# Patient Record
Sex: FEMALE | Race: WHITE | NOT HISPANIC OR LATINO | Employment: OTHER | URBAN - METROPOLITAN AREA
[De-identification: names, ages, dates, MRNs, and addresses within clinical notes are randomized per-mention and may not be internally consistent; named-entity substitution may affect disease eponyms.]

---

## 2021-01-14 DIAGNOSIS — Z23 NEED FOR VACCINATION: ICD-10-CM

## 2021-12-10 ENCOUNTER — TELEPHONE (OUTPATIENT)
Dept: SCHEDULING | Facility: IMAGING CENTER | Age: 81
End: 2021-12-10

## 2022-01-11 ENCOUNTER — OFFICE VISIT (OUTPATIENT)
Dept: MEDICAL GROUP | Facility: PHYSICIAN GROUP | Age: 82
End: 2022-01-11
Payer: MEDICARE

## 2022-01-11 VITALS
SYSTOLIC BLOOD PRESSURE: 160 MMHG | HEIGHT: 63 IN | OXYGEN SATURATION: 96 % | TEMPERATURE: 98.1 F | DIASTOLIC BLOOD PRESSURE: 78 MMHG | BODY MASS INDEX: 23.21 KG/M2 | RESPIRATION RATE: 14 BRPM | WEIGHT: 131 LBS | HEART RATE: 90 BPM

## 2022-01-11 DIAGNOSIS — Z13.29 THYROID DISORDER SCREEN: ICD-10-CM

## 2022-01-11 DIAGNOSIS — N63.0 BREAST MASS: ICD-10-CM

## 2022-01-11 DIAGNOSIS — Z13.0 SCREENING FOR DEFICIENCY ANEMIA: ICD-10-CM

## 2022-01-11 DIAGNOSIS — Z13.1 ENCOUNTER FOR SCREENING FOR DIABETES MELLITUS: ICD-10-CM

## 2022-01-11 DIAGNOSIS — Z13.6 SCREENING FOR CARDIOVASCULAR CONDITION: ICD-10-CM

## 2022-01-11 PROCEDURE — 99204 OFFICE O/P NEW MOD 45 MIN: CPT | Performed by: INTERNAL MEDICINE

## 2022-01-11 RX ORDER — SULFAMETHOXAZOLE AND TRIMETHOPRIM 800; 160 MG/1; MG/1
1 TABLET ORAL 2 TIMES DAILY
Qty: 20 TABLET | Refills: 0 | Status: SHIPPED | OUTPATIENT
Start: 2022-01-11

## 2022-01-11 ASSESSMENT — PATIENT HEALTH QUESTIONNAIRE - PHQ9: CLINICAL INTERPRETATION OF PHQ2 SCORE: 0

## 2022-01-11 NOTE — PROGRESS NOTES
"Subjective:     CC: Establish care    HISTORY OF THE PRESENT ILLNESS: Patient is a 81 y.o. female. This pleasant patient is here today to establish care and discuss the following issues:    The patient has not had medical care in many years.  We do not have records available for review.  She reports a prior diagnosis of colon cancer status post partial colectomy in 2008.  She reports a palpable right breast mass for over 1 year.  She states that it is nontender.  It has been cracked and bleeding for about 1 year.  She does not recall when she last had a mammogram.  She also states that she tripped and fell 9/2021 and broke her wrist and proximal humerus.  There was no head trauma or LOC.  The patient declined surgical intervention.    Allergies: Patient has no allergy information on record.    Current Outpatient Medications Ordered in Epic   Medication Sig Dispense Refill   • sulfamethoxazole-trimethoprim (BACTRIM DS) 800-160 MG tablet Take 1 Tablet by mouth 2 times a day. 20 Tablet 0     No current Epic-ordered facility-administered medications on file.       History reviewed. No pertinent past medical history.    History reviewed. No pertinent surgical history.    Social History     Tobacco Use   • Smoking status: Never Smoker   • Smokeless tobacco: Never Used   Substance Use Topics   • Alcohol use: Never   • Drug use: Never       Social History     Social History Narrative   • Not on file       History reviewed. No pertinent family history.    Health Maintenance: Completed    ROS:   Gen: no fevers/chills  Pulm: no sob, no cough  CV: no chest pain, no palpitations  GI: no nausea/vomiting, no diarrhea  Neuro: no headaches, no numbness/tingling      Objective:     Exam: /78   Pulse 90   Temp 36.7 °C (98.1 °F)   Resp 14   Ht 1.6 m (5' 3\")   Wt 59.4 kg (131 lb)   SpO2 96%  Body mass index is 23.21 kg/m².    General: Normal appearing. No distress.  Breast: 4 cm breast mass at 7 to 10 o' clock position with " overlying skin changes and bleeding of the right breast breast, examined seated and supine. No axillary or supraclavicular adenopathy.     Assessment & Plan:   81 y.o. female with the following -    Breast mass  This is an acute condition.  Progressive.  Exam findings concerning for breast cancer.  Have placed orders for an urgent mammogram and diagnostic ultrasound.  Will cover with Bactrim for possible overlying cellulitis.  - MA-DIAGNOSTIC MAMMO BILAT W/TOMOSYNTHESIS W/CAD; Future  - US-BREAST BILAT-COMPLETE; Future  - sulfamethoxazole-trimethoprim (BACTRIM DS) 800-160 MG tablet; Take 1 Tablet by mouth 2 times a day.  Dispense: 20 Tablet; Refill: 0    Screening for deficiency anemia  - CBC WITH DIFFERENTIAL; Future    Encounter for screening for diabetes mellitus  - Comp Metabolic Panel; Future    Thyroid disorder screen  - TSH WITH REFLEX TO FT4; Future    Screening for cardiovascular condition  - Lipid Profile; Future      Return in about 1 week (around 1/18/2022) for f/u labs.    Please note that this dictation was created using voice recognition software. I have made every reasonable attempt to correct obvious errors, but I expect that there are errors of grammar and possibly content that I did not discover before finalizing the note.

## 2022-01-12 ENCOUNTER — HOSPITAL ENCOUNTER (OUTPATIENT)
Dept: RADIOLOGY | Facility: MEDICAL CENTER | Age: 82
End: 2022-01-12
Attending: INTERNAL MEDICINE
Payer: MEDICARE

## 2022-01-12 ENCOUNTER — HOSPITAL ENCOUNTER (OUTPATIENT)
Dept: LAB | Facility: MEDICAL CENTER | Age: 82
End: 2022-01-12
Attending: INTERNAL MEDICINE
Payer: MEDICARE

## 2022-01-12 DIAGNOSIS — N63.0 BREAST MASS IN FEMALE: ICD-10-CM

## 2022-01-12 DIAGNOSIS — N64.52 DISCHARGE FROM BREAST: ICD-10-CM

## 2022-01-12 DIAGNOSIS — N64.4 BREAST PAIN: ICD-10-CM

## 2022-01-12 DIAGNOSIS — Z13.6 SCREENING FOR CARDIOVASCULAR CONDITION: ICD-10-CM

## 2022-01-12 DIAGNOSIS — Z13.0 SCREENING FOR DEFICIENCY ANEMIA: ICD-10-CM

## 2022-01-12 DIAGNOSIS — Z13.1 ENCOUNTER FOR SCREENING FOR DIABETES MELLITUS: ICD-10-CM

## 2022-01-12 DIAGNOSIS — Z13.29 THYROID DISORDER SCREEN: ICD-10-CM

## 2022-01-12 LAB
ALBUMIN SERPL BCP-MCNC: 4 G/DL (ref 3.2–4.9)
ALBUMIN/GLOB SERPL: 1.3 G/DL
ALP SERPL-CCNC: 51 U/L (ref 30–99)
ALT SERPL-CCNC: 32 U/L (ref 2–50)
ANION GAP SERPL CALC-SCNC: 15 MMOL/L (ref 7–16)
AST SERPL-CCNC: 22 U/L (ref 12–45)
BASOPHILS # BLD AUTO: 1.2 % (ref 0–1.8)
BASOPHILS # BLD: 0.08 K/UL (ref 0–0.12)
BILIRUB SERPL-MCNC: 0.4 MG/DL (ref 0.1–1.5)
BUN SERPL-MCNC: 18 MG/DL (ref 8–22)
CALCIUM SERPL-MCNC: 9.7 MG/DL (ref 8.5–10.5)
CHLORIDE SERPL-SCNC: 97 MMOL/L (ref 96–112)
CHOLEST SERPL-MCNC: 250 MG/DL (ref 100–199)
CO2 SERPL-SCNC: 22 MMOL/L (ref 20–33)
CREAT SERPL-MCNC: 0.74 MG/DL (ref 0.5–1.4)
EOSINOPHIL # BLD AUTO: 0.11 K/UL (ref 0–0.51)
EOSINOPHIL NFR BLD: 1.6 % (ref 0–6.9)
ERYTHROCYTE [DISTWIDTH] IN BLOOD BY AUTOMATED COUNT: 39.8 FL (ref 35.9–50)
GLOBULIN SER CALC-MCNC: 3.1 G/DL (ref 1.9–3.5)
GLUCOSE SERPL-MCNC: 385 MG/DL (ref 65–99)
HCT VFR BLD AUTO: 45 % (ref 37–47)
HDLC SERPL-MCNC: 68 MG/DL
HGB BLD-MCNC: 15.2 G/DL (ref 12–16)
IMM GRANULOCYTES # BLD AUTO: 0.01 K/UL (ref 0–0.11)
IMM GRANULOCYTES NFR BLD AUTO: 0.1 % (ref 0–0.9)
LDLC SERPL CALC-MCNC: 123 MG/DL
LYMPHOCYTES # BLD AUTO: 2.03 K/UL (ref 1–4.8)
LYMPHOCYTES NFR BLD: 29.7 % (ref 22–41)
MCH RBC QN AUTO: 28.1 PG (ref 27–33)
MCHC RBC AUTO-ENTMCNC: 33.8 G/DL (ref 33.6–35)
MCV RBC AUTO: 83.2 FL (ref 81.4–97.8)
MONOCYTES # BLD AUTO: 0.76 K/UL (ref 0–0.85)
MONOCYTES NFR BLD AUTO: 11.1 % (ref 0–13.4)
NEUTROPHILS # BLD AUTO: 3.85 K/UL (ref 2–7.15)
NEUTROPHILS NFR BLD: 56.3 % (ref 44–72)
NRBC # BLD AUTO: 0 K/UL
NRBC BLD-RTO: 0 /100 WBC
PLATELET # BLD AUTO: 261 K/UL (ref 164–446)
PMV BLD AUTO: 9.9 FL (ref 9–12.9)
POTASSIUM SERPL-SCNC: 3.8 MMOL/L (ref 3.6–5.5)
PROT SERPL-MCNC: 7.1 G/DL (ref 6–8.2)
RBC # BLD AUTO: 5.41 M/UL (ref 4.2–5.4)
SODIUM SERPL-SCNC: 134 MMOL/L (ref 135–145)
TRIGL SERPL-MCNC: 296 MG/DL (ref 0–149)
TSH SERPL DL<=0.005 MIU/L-ACNC: 3.78 UIU/ML (ref 0.38–5.33)
WBC # BLD AUTO: 6.8 K/UL (ref 4.8–10.8)

## 2022-01-12 PROCEDURE — 85025 COMPLETE CBC W/AUTO DIFF WBC: CPT | Mod: GA

## 2022-01-12 PROCEDURE — G0279 TOMOSYNTHESIS, MAMMO: HCPCS

## 2022-01-12 PROCEDURE — 80053 COMPREHEN METABOLIC PANEL: CPT

## 2022-01-12 PROCEDURE — 80061 LIPID PANEL: CPT

## 2022-01-12 PROCEDURE — 36415 COLL VENOUS BLD VENIPUNCTURE: CPT

## 2022-01-12 PROCEDURE — 76642 ULTRASOUND BREAST LIMITED: CPT | Mod: RT

## 2022-01-12 PROCEDURE — 84443 ASSAY THYROID STIM HORMONE: CPT | Mod: GA

## 2022-01-13 DIAGNOSIS — R73.01 ELEVATED FASTING GLUCOSE: ICD-10-CM

## 2022-01-21 ENCOUNTER — HOSPITAL ENCOUNTER (OUTPATIENT)
Dept: RADIOLOGY | Facility: MEDICAL CENTER | Age: 82
End: 2022-01-21
Attending: INTERNAL MEDICINE
Payer: MEDICARE

## 2022-01-21 DIAGNOSIS — R92.8 ABNORMAL FINDINGS ON DIAGNOSTIC IMAGING OF BREAST: ICD-10-CM

## 2022-01-21 LAB — PATHOLOGY CONSULT NOTE: NORMAL

## 2022-01-21 PROCEDURE — 10035 PLMT SFT TISS LOCLZJ DEV 1ST: CPT | Mod: XU

## 2022-01-21 PROCEDURE — 19083 BX BREAST 1ST LESION US IMAG: CPT | Mod: RT

## 2022-01-21 PROCEDURE — 19285 PERQ DEV BREAST 1ST US IMAG: CPT | Mod: RT

## 2022-01-21 PROCEDURE — 88342 IMHCHEM/IMCYTCHM 1ST ANTB: CPT

## 2022-01-21 PROCEDURE — 88305 TISSUE EXAM BY PATHOLOGIST: CPT

## 2022-01-21 PROCEDURE — 88341 IMHCHEM/IMCYTCHM EA ADD ANTB: CPT

## 2022-01-21 PROCEDURE — 76942 ECHO GUIDE FOR BIOPSY: CPT

## 2022-01-24 ENCOUNTER — TELEPHONE (OUTPATIENT)
Dept: RADIOLOGY | Facility: MEDICAL CENTER | Age: 82
End: 2022-01-24

## 2022-01-25 ENCOUNTER — TELEMEDICINE (OUTPATIENT)
Dept: MEDICAL GROUP | Facility: PHYSICIAN GROUP | Age: 82
End: 2022-01-25
Payer: MEDICARE

## 2022-01-25 VITALS — HEIGHT: 64 IN | BODY MASS INDEX: 22.2 KG/M2 | RESPIRATION RATE: 18 BRPM | WEIGHT: 130 LBS

## 2022-01-25 DIAGNOSIS — R73.01 ELEVATED FASTING GLUCOSE: ICD-10-CM

## 2022-01-25 DIAGNOSIS — D24.1 INTRADUCTAL PAPILLOMA OF RIGHT BREAST: ICD-10-CM

## 2022-01-25 DIAGNOSIS — E78.2 MIXED HYPERLIPIDEMIA: ICD-10-CM

## 2022-01-25 PROCEDURE — 99214 OFFICE O/P EST MOD 30 MIN: CPT | Mod: 95 | Performed by: INTERNAL MEDICINE

## 2022-01-25 RX ORDER — METFORMIN HYDROCHLORIDE 500 MG/1
500 TABLET, EXTENDED RELEASE ORAL DAILY
Qty: 30 TABLET | Refills: 2 | Status: SHIPPED | OUTPATIENT
Start: 2022-01-25

## 2022-01-25 ASSESSMENT — FIBROSIS 4 INDEX: FIB4 SCORE: 1.18

## 2022-01-25 NOTE — PROGRESS NOTES
"Virtual Visit: Established Patient   This visit was conducted via Zoom using secure and encrypted videoconferencing technology. The patient was in a private location in the state of [place].    The patient's identity was confirmed and verbal consent was obtained for this virtual visit.    Subjective:   CC:   Chief Complaint   Patient presents with   • Results     labs, imaging       Rohini Salinas is a 81 y.o. female presenting for evaluation and management of:    The patient feels well.  No acute complaints.  She is here to follow-up on breast imaging and biopsy results, as well as lab results.      ROS   Denies any recent fevers or chills. No nausea or vomiting. No chest pains or shortness of breath.     Not on File    Current medicines (including changes today)  Current Outpatient Medications   Medication Sig Dispense Refill   • metFORMIN ER (GLUCOPHAGE XR) 500 MG TABLET SR 24 HR Take 1 Tablet by mouth every day. 30 Tablet 2   • sulfamethoxazole-trimethoprim (BACTRIM DS) 800-160 MG tablet Take 1 Tablet by mouth 2 times a day. 20 Tablet 0     No current facility-administered medications for this visit.       Patient Active Problem List    Diagnosis Date Noted   • Mixed hyperlipidemia 01/25/2022   • Elevated fasting glucose 01/25/2022   • Intraductal papilloma of right breast 01/25/2022       History reviewed. No pertinent family history.    She  has no past medical history on file.  She  has no past surgical history on file.       Objective:   Resp 18   Ht 1.613 m (5' 3.5\") Comment: per pt  Wt 59 kg (130 lb) Comment: per pt  BMI 22.67 kg/m²     Physical Exam:  Constitutional: Alert, no distress, well-groomed.  Skin: No rashes in visible areas.  Eye: Round. Conjunctiva clear, lids normal. No icterus.   ENMT: Lips pink without lesions, good dentition, moist mucous membranes. Phonation normal.  Neck: No masses, no thyromegaly. Moves freely without pain.  Respiratory: Unlabored respiratory effort, no cough or " audible wheeze  Psych: Alert and oriented x3, normal affect and mood.       Assessment and Plan:   The following treatment plan was discussed:     Intraductal papilloma of right breast  This is a chronic medical condition.  Ongoing.  The patient underwent ultrasound-guided biopsy on 1/21/2022 which showed intraductal papilloma, no evidence of atypia or malignancy.  Have placed a referral to general surgery for further evaluation and treatment.  - Referral to General Surgery    Mixed hyperlipidemia  This is a chronic medical condition.  Ongoing.  Lipid panel from 1/12/2022 showed a total cholesterol 250, , HDL 68, triglycerides 296. The 10-year ASCVD risk score ([place] VASU Jr., et al., 2013) is: 35.4%  -We will defer starting medication until the patient is stabilized on her Metformin medication  -Patient advised to follow a low-cholesterol diet which means reduced red meat and pork products, fried or fatty foods, high fat dairy products    Elevated fasting glucose  This is a chronic medical condition.  Ongoing.  Patient had an elevated fasting glucose on 1/12/2022 of 385.  No hemoglobin A1c available.  -We will obtain a baseline hemoglobin A1c  -Start trial of Metformin 500 mg SR daily  - HEMOGLOBIN A1C; Future  - metFORMIN ER (GLUCOPHAGE XR) 500 MG TABLET SR 24 HR; Take 1 Tablet by mouth every day.  Dispense: 30 Tablet; Refill: 2      Follow-up: Return in about 4 weeks (around 2/22/2022) for Biopsy and Metformin.     Please note that this dictation was created using voice recognition software. I have made every reasonable attempt to correct obvious errors, but I expect that there are errors of grammar and possibly content that I did not discover before finalizing the note.

## 2022-01-28 LAB — HBA1C MFR BLD: 11.9 % (ref 4.8–5.6)

## 2022-06-01 ENCOUNTER — TELEPHONE (OUTPATIENT)
Dept: MEDICAL GROUP | Facility: PHYSICIAN GROUP | Age: 82
End: 2022-06-01
Payer: MEDICARE

## 2022-06-01 NOTE — TELEPHONE ENCOUNTER
1. Caller Name: Ashley                        Call Back Number: [phone]      How would the patient prefer to be contacted with a response:     Received call from the breast center stating pt had an abnormal mammo in Jan and was referred to general surgery.  Pt had an appointment that she cancelled.  Tried calling pt x 3 and was not able to leave [facility].

## 2025-02-23 ENCOUNTER — HOSPITAL ENCOUNTER (OUTPATIENT)
Dept: RADIOLOGY | Facility: MEDICAL CENTER | Age: 85
End: 2025-02-23

## 2025-02-23 ENCOUNTER — HOSPITAL ENCOUNTER (INPATIENT)
Facility: MEDICAL CENTER | Age: 85
LOS: 4 days | End: 2025-02-27
Attending: STUDENT IN AN ORGANIZED HEALTH CARE EDUCATION/TRAINING PROGRAM | Admitting: STUDENT IN AN ORGANIZED HEALTH CARE EDUCATION/TRAINING PROGRAM
Payer: MEDICARE

## 2025-02-23 DIAGNOSIS — K21.9 GASTROESOPHAGEAL REFLUX DISEASE, UNSPECIFIED WHETHER ESOPHAGITIS PRESENT: ICD-10-CM

## 2025-02-23 DIAGNOSIS — N63.10 MASS OF RIGHT BREAST, UNSPECIFIED QUADRANT: ICD-10-CM

## 2025-02-23 DIAGNOSIS — E11.65 TYPE 2 DIABETES MELLITUS WITH HYPERGLYCEMIA, WITHOUT LONG-TERM CURRENT USE OF INSULIN (HCC): ICD-10-CM

## 2025-02-23 DIAGNOSIS — N30.01 ACUTE CYSTITIS WITH HEMATURIA: ICD-10-CM

## 2025-02-23 PROBLEM — D72.829 LEUKOCYTOSIS: Status: ACTIVE | Noted: 2025-02-23

## 2025-02-23 PROBLEM — R79.89 ELEVATED BLOOD KETONE BODY LEVEL: Status: ACTIVE | Noted: 2025-02-23

## 2025-02-23 PROBLEM — N17.9 AKI (ACUTE KIDNEY INJURY) (HCC): Status: ACTIVE | Noted: 2025-02-23

## 2025-02-23 PROBLEM — E87.20 LACTIC ACID ACIDOSIS: Status: ACTIVE | Noted: 2025-02-23

## 2025-02-23 PROBLEM — N17.9 SEPSIS WITH ACUTE RENAL FAILURE (HCC): Status: ACTIVE | Noted: 2025-02-23

## 2025-02-23 PROBLEM — E87.6 HYPOKALEMIA: Status: ACTIVE | Noted: 2025-02-23

## 2025-02-23 PROBLEM — S42.331K: Status: ACTIVE | Noted: 2025-02-23

## 2025-02-23 PROBLEM — A41.9 SEPSIS WITH ACUTE RENAL FAILURE (HCC): Status: ACTIVE | Noted: 2025-02-23

## 2025-02-23 PROBLEM — E11.10 TYPE 2 DIABETES MELLITUS WITH KETOACIDOSIS WITHOUT COMA, WITHOUT LONG-TERM CURRENT USE OF INSULIN (HCC): Status: ACTIVE | Noted: 2025-02-23

## 2025-02-23 PROBLEM — I48.91 ATRIAL FIBRILLATION WITH RAPID VENTRICULAR RESPONSE (HCC): Status: ACTIVE | Noted: 2025-02-23

## 2025-02-23 PROBLEM — R65.20 SEPSIS WITH ACUTE RENAL FAILURE (HCC): Status: ACTIVE | Noted: 2025-02-23

## 2025-02-23 LAB
ALBUMIN SERPL BCP-MCNC: 2.6 G/DL (ref 3.2–4.9)
ALBUMIN/GLOB SERPL: 0.8 G/DL
ALP SERPL-CCNC: 59 U/L (ref 30–99)
ALT SERPL-CCNC: 23 U/L (ref 2–50)
ANION GAP SERPL CALC-SCNC: 19 MMOL/L (ref 7–16)
APPEARANCE UR: ABNORMAL
APTT PPP: 24.2 SEC (ref 24.7–36)
AST SERPL-CCNC: 26 U/L (ref 12–45)
B-OH-BUTYR SERPL-MCNC: 3.07 MMOL/L (ref 0.02–0.27)
BACTERIA #/AREA URNS HPF: ABNORMAL /HPF
BILIRUB SERPL-MCNC: 0.2 MG/DL (ref 0.1–1.5)
BILIRUB UR QL STRIP.AUTO: NEGATIVE
BUN SERPL-MCNC: 39 MG/DL (ref 8–22)
CALCIUM ALBUM COR SERPL-MCNC: 9.8 MG/DL (ref 8.5–10.5)
CALCIUM SERPL-MCNC: 8.7 MG/DL (ref 8.5–10.5)
CASTS URNS QL MICRO: ABNORMAL /LPF (ref 0–2)
CHLORIDE SERPL-SCNC: 97 MMOL/L (ref 96–112)
CO2 SERPL-SCNC: 21 MMOL/L (ref 20–33)
COLOR UR: YELLOW
CORTIS SERPL-MCNC: 108 UG/DL (ref 0–23)
CREAT SERPL-MCNC: 1.9 MG/DL (ref 0.5–1.4)
EPITHELIAL CELLS 1715: ABNORMAL /HPF (ref 0–5)
ERYTHROCYTE [DISTWIDTH] IN BLOOD BY AUTOMATED COUNT: 39.7 FL (ref 35.9–50)
EST. AVERAGE GLUCOSE BLD GHB EST-MCNC: 384 MG/DL
GFR SERPLBLD CREATININE-BSD FMLA CKD-EPI: 26 ML/MIN/1.73 M 2
GLOBULIN SER CALC-MCNC: 3.4 G/DL (ref 1.9–3.5)
GLUCOSE BLD STRIP.AUTO-MCNC: 391 MG/DL (ref 65–99)
GLUCOSE SERPL-MCNC: 411 MG/DL (ref 65–99)
GLUCOSE UR STRIP.AUTO-MCNC: 250 MG/DL
HBA1C MFR BLD: 15 % (ref 4–5.6)
HCT VFR BLD AUTO: 36.2 % (ref 37–47)
HGB BLD-MCNC: 11.3 G/DL (ref 12–16)
HOLDING TUBE BB 8507: NORMAL
INR PPP: 1.26 (ref 0.87–1.13)
INR PPP: 1.28 (ref 0.87–1.13)
KETONES UR STRIP.AUTO-MCNC: 15 MG/DL
LACTATE SERPL-SCNC: 3.8 MMOL/L (ref 0.5–2)
LEUKOCYTE ESTERASE UR QL STRIP.AUTO: ABNORMAL
MAGNESIUM SERPL-MCNC: 2 MG/DL (ref 1.5–2.5)
MCH RBC QN AUTO: 24.9 PG (ref 27–33)
MCHC RBC AUTO-ENTMCNC: 31.2 G/DL (ref 32.2–35.5)
MCV RBC AUTO: 79.7 FL (ref 81.4–97.8)
MICRO URNS: ABNORMAL
NITRITE UR QL STRIP.AUTO: NEGATIVE
PH UR STRIP.AUTO: 5 [PH] (ref 5–8)
PHOSPHATE SERPL-MCNC: 2.9 MG/DL (ref 2.5–4.5)
PLATELET # BLD AUTO: 416 K/UL (ref 164–446)
PMV BLD AUTO: 9.5 FL (ref 9–12.9)
POTASSIUM SERPL-SCNC: 3.5 MMOL/L (ref 3.6–5.5)
PROT SERPL-MCNC: 6 G/DL (ref 6–8.2)
PROT UR QL STRIP: 100 MG/DL
PROTHROMBIN TIME: 15.8 SEC (ref 12–14.6)
PROTHROMBIN TIME: 16 SEC (ref 12–14.6)
RBC # BLD AUTO: 4.54 M/UL (ref 4.2–5.4)
RBC # URNS HPF: ABNORMAL /HPF (ref 0–2)
RBC UR QL AUTO: ABNORMAL
SODIUM SERPL-SCNC: 137 MMOL/L (ref 135–145)
SP GR UR STRIP.AUTO: 1.01
TSH SERPL DL<=0.005 MIU/L-ACNC: 0.58 UIU/ML (ref 0.38–5.33)
UFH PPP CHRO-ACNC: <0.1 IU/ML
UROBILINOGEN UR STRIP.AUTO-MCNC: 0.2 EU/DL
WBC # BLD AUTO: 22.8 K/UL (ref 4.8–10.8)
WBC #/AREA URNS HPF: ABNORMAL /HPF
YEAST BUDDING URNS QL: PRESENT /HPF
YEAST HYPHAE #/AREA URNS HPF: PRESENT /HPF

## 2025-02-23 PROCEDURE — 85027 COMPLETE CBC AUTOMATED: CPT

## 2025-02-23 PROCEDURE — 80053 COMPREHEN METABOLIC PANEL: CPT

## 2025-02-23 PROCEDURE — 87086 URINE CULTURE/COLONY COUNT: CPT

## 2025-02-23 PROCEDURE — A9270 NON-COVERED ITEM OR SERVICE: HCPCS | Performed by: STUDENT IN AN ORGANIZED HEALTH CARE EDUCATION/TRAINING PROGRAM

## 2025-02-23 PROCEDURE — 83735 ASSAY OF MAGNESIUM: CPT

## 2025-02-23 PROCEDURE — 82533 TOTAL CORTISOL: CPT

## 2025-02-23 PROCEDURE — 85520 HEPARIN ASSAY: CPT

## 2025-02-23 PROCEDURE — 85610 PROTHROMBIN TIME: CPT | Mod: 91

## 2025-02-23 PROCEDURE — 84443 ASSAY THYROID STIM HORMONE: CPT

## 2025-02-23 PROCEDURE — 82010 KETONE BODYS QUAN: CPT

## 2025-02-23 PROCEDURE — 83036 HEMOGLOBIN GLYCOSYLATED A1C: CPT

## 2025-02-23 PROCEDURE — 93005 ELECTROCARDIOGRAM TRACING: CPT | Mod: TC | Performed by: STUDENT IN AN ORGANIZED HEALTH CARE EDUCATION/TRAINING PROGRAM

## 2025-02-23 PROCEDURE — 87040 BLOOD CULTURE FOR BACTERIA: CPT

## 2025-02-23 PROCEDURE — 84100 ASSAY OF PHOSPHORUS: CPT

## 2025-02-23 PROCEDURE — 700102 HCHG RX REV CODE 250 W/ 637 OVERRIDE(OP): Performed by: STUDENT IN AN ORGANIZED HEALTH CARE EDUCATION/TRAINING PROGRAM

## 2025-02-23 PROCEDURE — 81001 URINALYSIS AUTO W/SCOPE: CPT

## 2025-02-23 PROCEDURE — 700111 HCHG RX REV CODE 636 W/ 250 OVERRIDE (IP): Performed by: STUDENT IN AN ORGANIZED HEALTH CARE EDUCATION/TRAINING PROGRAM

## 2025-02-23 PROCEDURE — 770022 HCHG ROOM/CARE - ICU (200)

## 2025-02-23 PROCEDURE — 700105 HCHG RX REV CODE 258: Performed by: STUDENT IN AN ORGANIZED HEALTH CARE EDUCATION/TRAINING PROGRAM

## 2025-02-23 PROCEDURE — 83605 ASSAY OF LACTIC ACID: CPT

## 2025-02-23 PROCEDURE — 82962 GLUCOSE BLOOD TEST: CPT | Mod: 91

## 2025-02-23 PROCEDURE — 85730 THROMBOPLASTIN TIME PARTIAL: CPT

## 2025-02-23 PROCEDURE — 99291 CRITICAL CARE FIRST HOUR: CPT | Performed by: STUDENT IN AN ORGANIZED HEALTH CARE EDUCATION/TRAINING PROGRAM

## 2025-02-23 RX ORDER — AZITHROMYCIN 500 MG/5ML
500 INJECTION, POWDER, LYOPHILIZED, FOR SOLUTION INTRAVENOUS DAILY
Status: DISCONTINUED | OUTPATIENT
Start: 2025-02-24 | End: 2025-02-24

## 2025-02-23 RX ORDER — HEPARIN SODIUM 1000 [USP'U]/ML
40 INJECTION, SOLUTION INTRAVENOUS; SUBCUTANEOUS PRN
Status: DISCONTINUED | OUTPATIENT
Start: 2025-02-23 | End: 2025-02-25 | Stop reason: ALTCHOICE

## 2025-02-23 RX ORDER — THIAMINE HYDROCHLORIDE 100 MG/ML
100 INJECTION, SOLUTION INTRAMUSCULAR; INTRAVENOUS DAILY
Status: DISCONTINUED | OUTPATIENT
Start: 2025-02-25 | End: 2025-02-26

## 2025-02-23 RX ORDER — INSULIN LISPRO 100 [IU]/ML
3-14 INJECTION, SOLUTION INTRAVENOUS; SUBCUTANEOUS
Status: DISCONTINUED | OUTPATIENT
Start: 2025-02-23 | End: 2025-02-27 | Stop reason: HOSPADM

## 2025-02-23 RX ORDER — HEPARIN SODIUM 1000 [USP'U]/ML
80 INJECTION, SOLUTION INTRAVENOUS; SUBCUTANEOUS ONCE
Status: COMPLETED | OUTPATIENT
Start: 2025-02-23 | End: 2025-02-23

## 2025-02-23 RX ORDER — ACETAMINOPHEN 325 MG/1
650 TABLET ORAL EVERY 6 HOURS PRN
Status: DISCONTINUED | OUTPATIENT
Start: 2025-02-23 | End: 2025-02-27 | Stop reason: HOSPADM

## 2025-02-23 RX ORDER — POLYETHYLENE GLYCOL 3350 17 G/17G
1 POWDER, FOR SOLUTION ORAL
Status: DISCONTINUED | OUTPATIENT
Start: 2025-02-23 | End: 2025-02-25

## 2025-02-23 RX ORDER — MIDODRINE HYDROCHLORIDE 5 MG/1
15 TABLET ORAL
Status: DISCONTINUED | OUTPATIENT
Start: 2025-02-23 | End: 2025-02-27 | Stop reason: HOSPADM

## 2025-02-23 RX ORDER — THIAMINE HYDROCHLORIDE 100 MG/ML
200 INJECTION, SOLUTION INTRAMUSCULAR; INTRAVENOUS 2 TIMES DAILY
Status: COMPLETED | OUTPATIENT
Start: 2025-02-23 | End: 2025-02-24

## 2025-02-23 RX ORDER — INSULIN LISPRO 100 [IU]/ML
0.2 INJECTION, SOLUTION INTRAVENOUS; SUBCUTANEOUS
Status: DISCONTINUED | OUTPATIENT
Start: 2025-02-24 | End: 2025-02-27 | Stop reason: HOSPADM

## 2025-02-23 RX ORDER — ENOXAPARIN SODIUM 100 MG/ML
40 INJECTION SUBCUTANEOUS DAILY
Status: DISCONTINUED | OUTPATIENT
Start: 2025-02-23 | End: 2025-02-23

## 2025-02-23 RX ORDER — HEPARIN SODIUM 5000 [USP'U]/100ML
0-30 INJECTION, SOLUTION INTRAVENOUS CONTINUOUS
Status: DISCONTINUED | OUTPATIENT
Start: 2025-02-23 | End: 2025-02-25 | Stop reason: ALTCHOICE

## 2025-02-23 RX ORDER — SODIUM CHLORIDE, SODIUM LACTATE, POTASSIUM CHLORIDE, AND CALCIUM CHLORIDE .6; .31; .03; .02 G/100ML; G/100ML; G/100ML; G/100ML
1000 INJECTION, SOLUTION INTRAVENOUS ONCE
Status: COMPLETED | OUTPATIENT
Start: 2025-02-23 | End: 2025-02-23

## 2025-02-23 RX ORDER — AMOXICILLIN 250 MG
2 CAPSULE ORAL EVERY EVENING
Status: DISCONTINUED | OUTPATIENT
Start: 2025-02-23 | End: 2025-02-25

## 2025-02-23 RX ORDER — DEXTROSE MONOHYDRATE 25 G/50ML
25 INJECTION, SOLUTION INTRAVENOUS
Status: DISCONTINUED | OUTPATIENT
Start: 2025-02-23 | End: 2025-02-27 | Stop reason: HOSPADM

## 2025-02-23 RX ORDER — DIGOXIN 0.25 MG/ML
250 INJECTION INTRAMUSCULAR; INTRAVENOUS ONCE
Status: COMPLETED | OUTPATIENT
Start: 2025-02-23 | End: 2025-02-23

## 2025-02-23 RX ORDER — DEXTROSE MONOHYDRATE AND SODIUM CHLORIDE 5; .45 G/100ML; G/100ML
INJECTION, SOLUTION INTRAVENOUS CONTINUOUS
Status: DISCONTINUED | OUTPATIENT
Start: 2025-02-23 | End: 2025-02-23

## 2025-02-23 RX ADMIN — INSULIN LISPRO 12 UNITS: 100 INJECTION, SOLUTION INTRAVENOUS; SUBCUTANEOUS at 22:47

## 2025-02-23 RX ADMIN — MIDODRINE HYDROCHLORIDE 15 MG: 5 TABLET ORAL at 23:16

## 2025-02-23 RX ADMIN — HEPARIN SODIUM 3700 UNITS: 1000 INJECTION, SOLUTION INTRAVENOUS; SUBCUTANEOUS at 22:48

## 2025-02-23 RX ADMIN — SODIUM CHLORIDE, POTASSIUM CHLORIDE, SODIUM LACTATE AND CALCIUM CHLORIDE 1000 ML: 600; 310; 30; 20 INJECTION, SOLUTION INTRAVENOUS at 21:06

## 2025-02-23 RX ADMIN — DIGOXIN 250 MCG: 250 INJECTION, SOLUTION INTRAMUSCULAR; INTRAVENOUS; PARENTERAL at 22:56

## 2025-02-23 RX ADMIN — PHENYLEPHRINE HYDROCHLORIDE 0.25 MCG/KG/MIN: 100 INJECTION INTRAVENOUS at 22:46

## 2025-02-23 RX ADMIN — INSULIN GLARGINE-YFGN 10 UNITS: 100 INJECTION, SOLUTION SUBCUTANEOUS at 22:36

## 2025-02-23 RX ADMIN — HEPARIN SODIUM 18 UNITS/KG/HR: 5000 INJECTION, SOLUTION INTRAVENOUS at 22:52

## 2025-02-23 RX ADMIN — THIAMINE HYDROCHLORIDE 200 MG: 100 INJECTION, SOLUTION INTRAMUSCULAR; INTRAVENOUS at 22:41

## 2025-02-23 ASSESSMENT — ENCOUNTER SYMPTOMS
FALLS: 1
FEVER: 0
SORE THROAT: 0
ABDOMINAL PAIN: 0
EYES NEGATIVE: 1
SHORTNESS OF BREATH: 0
HEADACHES: 0
WEAKNESS: 1
NAUSEA: 0
FOCAL WEAKNESS: 0
PALPITATIONS: 0
VOMITING: 0
CHILLS: 0
SPUTUM PRODUCTION: 0

## 2025-02-23 ASSESSMENT — PAIN DESCRIPTION - PAIN TYPE: TYPE: ACUTE PAIN

## 2025-02-23 NOTE — PROGRESS NOTES
[facility] DIRECT ADMIT ACCEPTANCE NOTE    Transferring facility: [facility]  Transferring provider: Tata    Chief complaint: DKA, PNA, Afib, fungating breast mass, R humerus fracture  Pertinent history & patient course:   85 yo F with T2DM who presented to the ED following a fall at home. When she came to the ED she was found to be in Afib with RVR (rates in the 140s). She was found to be in DKA/HHS with elevated blood sugar and HCO3 of 18 with AG of 27.     She was also found to have a R mid humerus fracture and a fungating R breast mass. CT chest with bilateral PNA.     She was given 2L IVF and started on an insuling gtt. Given ceftriaxone and azithromycin.     Last Vital signs:  , RR 18 not on supplemental oxygen, /64    Pertinent imaging & lab results: See above  Consultants called prior to transfer and pertinent input from consultants: N/A  Code Status: Full per transferring provider, I personally verified with the transferring provider patient's code status and the transferring provider has confirmed this with the patient.  Reason for Transfer: DKA, sepsis  Further work up or recommendations requested prior to transfer: None    Patient accepted for transfer: Yes  Accepting [facility] Facility: [facility] Nursing to notify the Triage Coordinator in the RTOC via Voalte or Phone ext. [phone] when patient arrives to the unit. The Triage Coordinator will assign the admitting provider.    Consultants to be called upon arrival: N/A  Admission status: Inpatient.   Floor requested: Any available ICU    The admitting provider is the point of contact for questions or concerns regarding the patient's care.    Ruben Patel DO, MPH  Critical Care Medicine

## 2025-02-24 ENCOUNTER — APPOINTMENT (OUTPATIENT)
Dept: RADIOLOGY | Facility: MEDICAL CENTER | Age: 85
End: 2025-02-24
Attending: EMERGENCY MEDICINE
Payer: MEDICARE

## 2025-02-24 PROBLEM — E87.20 LACTIC ACID ACIDOSIS: Status: RESOLVED | Noted: 2025-02-23 | Resolved: 2025-02-24

## 2025-02-24 PROBLEM — D72.829 LEUKOCYTOSIS: Status: RESOLVED | Noted: 2025-02-23 | Resolved: 2025-02-24

## 2025-02-24 PROBLEM — E87.6 HYPOKALEMIA: Status: RESOLVED | Noted: 2025-02-23 | Resolved: 2025-02-24

## 2025-02-24 PROBLEM — R79.89 ELEVATED BLOOD KETONE BODY LEVEL: Status: RESOLVED | Noted: 2025-02-23 | Resolved: 2025-02-24

## 2025-02-24 LAB
ALBUMIN SERPL BCP-MCNC: 2.5 G/DL (ref 3.2–4.9)
ALBUMIN/GLOB SERPL: 0.7 G/DL
ALP SERPL-CCNC: 71 U/L (ref 30–99)
ALT SERPL-CCNC: 22 U/L (ref 2–50)
ANION GAP SERPL CALC-SCNC: 10 MMOL/L (ref 7–16)
ANION GAP SERPL CALC-SCNC: 12 MMOL/L (ref 7–16)
AST SERPL-CCNC: 23 U/L (ref 12–45)
B-OH-BUTYR SERPL-MCNC: 0.27 MMOL/L (ref 0.02–0.27)
BACTERIA BLD CULT: NORMAL
BACTERIA BLD CULT: NORMAL
BASE EXCESS BLDV CALC-SCNC: 3 MMOL/L (ref -2–3)
BILIRUB SERPL-MCNC: <0.2 MG/DL (ref 0.1–1.5)
BODY TEMPERATURE: ABNORMAL DEGREES
BUN SERPL-MCNC: 39 MG/DL (ref 8–22)
BUN SERPL-MCNC: 39 MG/DL (ref 8–22)
CALCIUM ALBUM COR SERPL-MCNC: 9.9 MG/DL (ref 8.5–10.5)
CALCIUM SERPL-MCNC: 8.1 MG/DL (ref 8.5–10.5)
CALCIUM SERPL-MCNC: 8.7 MG/DL (ref 8.5–10.5)
CHLORIDE SERPL-SCNC: 101 MMOL/L (ref 96–112)
CHLORIDE SERPL-SCNC: 103 MMOL/L (ref 96–112)
CO2 BLDV-SCNC: 29 MMOL/L (ref 20–33)
CO2 SERPL-SCNC: 24 MMOL/L (ref 20–33)
CO2 SERPL-SCNC: 25 MMOL/L (ref 20–33)
CREAT SERPL-MCNC: 1.73 MG/DL (ref 0.5–1.4)
CREAT SERPL-MCNC: 1.78 MG/DL (ref 0.5–1.4)
DELSYS IDSYS: ABNORMAL
EKG IMPRESSION: NORMAL
ERYTHROCYTE [DISTWIDTH] IN BLOOD BY AUTOMATED COUNT: 40.5 FL (ref 35.9–50)
GFR SERPLBLD CREATININE-BSD FMLA CKD-EPI: 28 ML/MIN/1.73 M 2
GFR SERPLBLD CREATININE-BSD FMLA CKD-EPI: 29 ML/MIN/1.73 M 2
GLOBULIN SER CALC-MCNC: 3.7 G/DL (ref 1.9–3.5)
GLUCOSE BLD STRIP.AUTO-MCNC: 150 MG/DL (ref 65–99)
GLUCOSE BLD STRIP.AUTO-MCNC: 176 MG/DL (ref 65–99)
GLUCOSE BLD STRIP.AUTO-MCNC: 177 MG/DL (ref 65–99)
GLUCOSE BLD STRIP.AUTO-MCNC: 237 MG/DL (ref 65–99)
GLUCOSE BLD STRIP.AUTO-MCNC: 244 MG/DL (ref 65–99)
GLUCOSE BLD STRIP.AUTO-MCNC: 359 MG/DL (ref 65–99)
GLUCOSE SERPL-MCNC: 189 MG/DL (ref 65–99)
GLUCOSE SERPL-MCNC: 249 MG/DL (ref 65–99)
HCO3 BLDV-SCNC: 28 MMOL/L (ref 22–29)
HCT VFR BLD AUTO: 36.7 % (ref 37–47)
HGB BLD-MCNC: 11.5 G/DL (ref 12–16)
LACTATE BLD-SCNC: 1.4 MMOL/L (ref 0.5–2)
LACTATE SERPL-SCNC: 2.2 MMOL/L (ref 0.5–2)
LACTATE SERPL-SCNC: 2.6 MMOL/L (ref 0.5–2)
LACTATE SERPL-SCNC: 2.6 MMOL/L (ref 0.5–2)
LPM ILPM: 1 LPM
MAGNESIUM SERPL-MCNC: 2 MG/DL (ref 1.5–2.5)
MCH RBC QN AUTO: 24.8 PG (ref 27–33)
MCHC RBC AUTO-ENTMCNC: 31.3 G/DL (ref 32.2–35.5)
MCV RBC AUTO: 79.3 FL (ref 81.4–97.8)
PCO2 BLDV: 45.5 MMHG (ref 38–54)
PCO2 TEMP ADJ BLDV: 44.9 MMHG (ref 38–54)
PH BLDV: 7.4 [PH] (ref 7.31–7.45)
PH TEMP ADJ BLDV: 7.4 [PH] (ref 7.31–7.45)
PHOSPHATE SERPL-MCNC: 2 MG/DL (ref 2.5–4.5)
PLATELET # BLD AUTO: 515 K/UL (ref 164–446)
PMV BLD AUTO: 9.6 FL (ref 9–12.9)
PO2 BLDV: 31 MMHG (ref 23–48)
PO2 TEMP ADJ BLDV: 31 MMHG (ref 23–48)
POTASSIUM SERPL-SCNC: 3.7 MMOL/L (ref 3.6–5.5)
POTASSIUM SERPL-SCNC: 4 MMOL/L (ref 3.6–5.5)
PROT SERPL-MCNC: 6.2 G/DL (ref 6–8.2)
RBC # BLD AUTO: 4.63 M/UL (ref 4.2–5.4)
SAO2 % BLDV: 59 % (ref 60–85)
SIGNIFICANT IND 70042: NORMAL
SIGNIFICANT IND 70042: NORMAL
SITE SITE: NORMAL
SITE SITE: NORMAL
SODIUM SERPL-SCNC: 137 MMOL/L (ref 135–145)
SODIUM SERPL-SCNC: 138 MMOL/L (ref 135–145)
SOURCE SOURCE: NORMAL
SOURCE SOURCE: NORMAL
SPECIMEN DRAWN FROM PATIENT: ABNORMAL
UFH PPP CHRO-ACNC: 0.27 IU/ML
UFH PPP CHRO-ACNC: 0.3 IU/ML
UFH PPP CHRO-ACNC: 0.37 IU/ML
WBC # BLD AUTO: 29.2 K/UL (ref 4.8–10.8)

## 2025-02-24 PROCEDURE — 80053 COMPREHEN METABOLIC PANEL: CPT

## 2025-02-24 PROCEDURE — 99291 CRITICAL CARE FIRST HOUR: CPT | Performed by: EMERGENCY MEDICINE

## 2025-02-24 PROCEDURE — 82962 GLUCOSE BLOOD TEST: CPT | Mod: 91

## 2025-02-24 PROCEDURE — 700105 HCHG RX REV CODE 258: Performed by: STUDENT IN AN ORGANIZED HEALTH CARE EDUCATION/TRAINING PROGRAM

## 2025-02-24 PROCEDURE — 700102 HCHG RX REV CODE 250 W/ 637 OVERRIDE(OP): Performed by: EMERGENCY MEDICINE

## 2025-02-24 PROCEDURE — 97602 WOUND(S) CARE NON-SELECTIVE: CPT

## 2025-02-24 PROCEDURE — 93010 ELECTROCARDIOGRAM REPORT: CPT | Performed by: INTERNAL MEDICINE

## 2025-02-24 PROCEDURE — 85520 HEPARIN ASSAY: CPT | Mod: 91

## 2025-02-24 PROCEDURE — 36600 WITHDRAWAL OF ARTERIAL BLOOD: CPT

## 2025-02-24 PROCEDURE — 80048 BASIC METABOLIC PNL TOTAL CA: CPT

## 2025-02-24 PROCEDURE — 700111 HCHG RX REV CODE 636 W/ 250 OVERRIDE (IP): Performed by: STUDENT IN AN ORGANIZED HEALTH CARE EDUCATION/TRAINING PROGRAM

## 2025-02-24 PROCEDURE — 84100 ASSAY OF PHOSPHORUS: CPT

## 2025-02-24 PROCEDURE — 83735 ASSAY OF MAGNESIUM: CPT

## 2025-02-24 PROCEDURE — 770022 HCHG ROOM/CARE - ICU (200)

## 2025-02-24 PROCEDURE — 99222 1ST HOSP IP/OBS MODERATE 55: CPT | Mod: 57 | Performed by: ORTHOPAEDIC SURGERY

## 2025-02-24 PROCEDURE — 83605 ASSAY OF LACTIC ACID: CPT | Mod: 91

## 2025-02-24 PROCEDURE — 700101 HCHG RX REV CODE 250: Performed by: INTERNAL MEDICINE

## 2025-02-24 PROCEDURE — A9270 NON-COVERED ITEM OR SERVICE: HCPCS | Performed by: EMERGENCY MEDICINE

## 2025-02-24 PROCEDURE — 700101 HCHG RX REV CODE 250: Performed by: EMERGENCY MEDICINE

## 2025-02-24 PROCEDURE — 82803 BLOOD GASES ANY COMBINATION: CPT

## 2025-02-24 PROCEDURE — 76775 US EXAM ABDO BACK WALL LIM: CPT

## 2025-02-24 PROCEDURE — 82010 KETONE BODYS QUAN: CPT

## 2025-02-24 PROCEDURE — 700105 HCHG RX REV CODE 258: Performed by: INTERNAL MEDICINE

## 2025-02-24 PROCEDURE — A9270 NON-COVERED ITEM OR SERVICE: HCPCS | Performed by: STUDENT IN AN ORGANIZED HEALTH CARE EDUCATION/TRAINING PROGRAM

## 2025-02-24 PROCEDURE — 700111 HCHG RX REV CODE 636 W/ 250 OVERRIDE (IP): Mod: JZ | Performed by: EMERGENCY MEDICINE

## 2025-02-24 PROCEDURE — 700111 HCHG RX REV CODE 636 W/ 250 OVERRIDE (IP): Mod: JZ | Performed by: INTERNAL MEDICINE

## 2025-02-24 PROCEDURE — 700105 HCHG RX REV CODE 258: Performed by: EMERGENCY MEDICINE

## 2025-02-24 PROCEDURE — 85027 COMPLETE CBC AUTOMATED: CPT

## 2025-02-24 PROCEDURE — 700102 HCHG RX REV CODE 250 W/ 637 OVERRIDE(OP): Performed by: STUDENT IN AN ORGANIZED HEALTH CARE EDUCATION/TRAINING PROGRAM

## 2025-02-24 PROCEDURE — 24500 CLTX HUMRL SHFT FX W/O MNPJ: CPT | Mod: RT | Performed by: ORTHOPAEDIC SURGERY

## 2025-02-24 RX ORDER — SODIUM CHLORIDE, SODIUM LACTATE, POTASSIUM CHLORIDE, AND CALCIUM CHLORIDE .6; .31; .03; .02 G/100ML; G/100ML; G/100ML; G/100ML
500 INJECTION, SOLUTION INTRAVENOUS ONCE
Status: COMPLETED | OUTPATIENT
Start: 2025-02-24 | End: 2025-02-24

## 2025-02-24 RX ORDER — AZITHROMYCIN 500 MG/5ML
500 INJECTION, POWDER, LYOPHILIZED, FOR SOLUTION INTRAVENOUS DAILY
Status: DISCONTINUED | OUTPATIENT
Start: 2025-02-24 | End: 2025-02-25

## 2025-02-24 RX ORDER — MICONAZOLE NITRATE 20 MG/G
CREAM TOPICAL 2 TIMES DAILY
Status: DISCONTINUED | OUTPATIENT
Start: 2025-02-24 | End: 2025-02-27 | Stop reason: HOSPADM

## 2025-02-24 RX ORDER — DEXTROSE MONOHYDRATE 50 MG/ML
INJECTION, SOLUTION INTRAVENOUS CONTINUOUS
Status: DISCONTINUED | OUTPATIENT
Start: 2025-02-24 | End: 2025-02-25

## 2025-02-24 RX ORDER — NYSTATIN 100000 [USP'U]/G
POWDER TOPICAL 2 TIMES DAILY
Status: DISCONTINUED | OUTPATIENT
Start: 2025-02-24 | End: 2025-02-24

## 2025-02-24 RX ADMIN — DEXTROSE MONOHYDRATE: 50 INJECTION, SOLUTION INTRAVENOUS at 08:02

## 2025-02-24 RX ADMIN — AMIODARONE HYDROCHLORIDE 1 MG/MIN: 1.8 INJECTION, SOLUTION INTRAVENOUS at 08:26

## 2025-02-24 RX ADMIN — THIAMINE HYDROCHLORIDE 200 MG: 100 INJECTION, SOLUTION INTRAMUSCULAR; INTRAVENOUS at 18:15

## 2025-02-24 RX ADMIN — THIAMINE HYDROCHLORIDE 200 MG: 100 INJECTION, SOLUTION INTRAMUSCULAR; INTRAVENOUS at 06:07

## 2025-02-24 RX ADMIN — DEXTROSE MONOHYDRATE: 50 INJECTION, SOLUTION INTRAVENOUS at 21:03

## 2025-02-24 RX ADMIN — MICONAZOLE NITRATE: 2 CREAM TOPICAL at 18:18

## 2025-02-24 RX ADMIN — INSULIN LISPRO 4 UNITS: 100 INJECTION, SOLUTION INTRAVENOUS; SUBCUTANEOUS at 18:10

## 2025-02-24 RX ADMIN — SODIUM CHLORIDE, POTASSIUM CHLORIDE, SODIUM LACTATE AND CALCIUM CHLORIDE 500 ML: 600; 310; 30; 20 INJECTION, SOLUTION INTRAVENOUS at 14:12

## 2025-02-24 RX ADMIN — CEFTRIAXONE SODIUM 2000 MG: 10 INJECTION, POWDER, FOR SOLUTION INTRAVENOUS at 20:51

## 2025-02-24 RX ADMIN — NYSTATIN: 100000 POWDER TOPICAL at 08:57

## 2025-02-24 RX ADMIN — SODIUM CHLORIDE, POTASSIUM CHLORIDE, SODIUM LACTATE AND CALCIUM CHLORIDE 500 ML: 600; 310; 30; 20 INJECTION, SOLUTION INTRAVENOUS at 18:05

## 2025-02-24 RX ADMIN — INSULIN GLARGINE-YFGN 10 UNITS: 100 INJECTION, SOLUTION SUBCUTANEOUS at 18:12

## 2025-02-24 RX ADMIN — HEPARIN SODIUM 20 UNITS/KG/HR: 5000 INJECTION, SOLUTION INTRAVENOUS at 15:48

## 2025-02-24 RX ADMIN — INSULIN LISPRO 3 UNITS: 100 INJECTION, SOLUTION INTRAVENOUS; SUBCUTANEOUS at 06:16

## 2025-02-24 RX ADMIN — SODIUM CHLORIDE, POTASSIUM CHLORIDE, SODIUM LACTATE AND CALCIUM CHLORIDE 500 ML: 600; 310; 30; 20 INJECTION, SOLUTION INTRAVENOUS at 22:45

## 2025-02-24 RX ADMIN — INSULIN LISPRO 3 UNITS: 100 INJECTION, SOLUTION INTRAVENOUS; SUBCUTANEOUS at 20:45

## 2025-02-24 RX ADMIN — AMIODARONE HYDROCHLORIDE 150 MG: 1.5 INJECTION, SOLUTION INTRAVENOUS at 08:09

## 2025-02-24 RX ADMIN — AZITHROMYCIN DIHYDRATE 500 MG: 500 INJECTION, POWDER, LYOPHILIZED, FOR SOLUTION INTRAVENOUS at 20:58

## 2025-02-24 RX ADMIN — INSULIN LISPRO 4 UNITS: 100 INJECTION, SOLUTION INTRAVENOUS; SUBCUTANEOUS at 11:51

## 2025-02-24 RX ADMIN — SODIUM CHLORIDE, POTASSIUM CHLORIDE, SODIUM LACTATE AND CALCIUM CHLORIDE 500 ML: 600; 310; 30; 20 INJECTION, SOLUTION INTRAVENOUS at 08:45

## 2025-02-24 RX ADMIN — SODIUM PHOSPHATE, MONOBASIC, MONOHYDRATE AND SODIUM PHOSPHATE, DIBASIC, ANHYDROUS 15 MMOL: 276; 142 INJECTION, SOLUTION INTRAVENOUS at 11:00

## 2025-02-24 RX ADMIN — AMIODARONE HYDROCHLORIDE 0.5 MG/MIN: 1.8 INJECTION, SOLUTION INTRAVENOUS at 14:07

## 2025-02-24 RX ADMIN — MIDODRINE HYDROCHLORIDE 15 MG: 5 TABLET ORAL at 18:15

## 2025-02-24 ASSESSMENT — PAIN DESCRIPTION - PAIN TYPE
TYPE: ACUTE PAIN

## 2025-02-24 NOTE — THERAPY
Occupational Therapy Contact Note    OT order received. Spoke with bedside RN who requested to hold evaluation due to medical instability and current mentation. OT will follow up as able.    Darcy Farris OTR/L

## 2025-02-24 NOTE — ASSESSMENT & PLAN NOTE
Sepsis vs malnutrition / thiamine deficiency  Empiric abx   Blood cultures pending  High dose thiamine  trend

## 2025-02-24 NOTE — H&P
"Critical Care History & Physical    Date of consult: 02/23/25    Referring Physician  Iván Merritt M.D.    Reason for Consultation  DKA, hypotension, tachycardia    History of Presenting Illness  84 y.o. female with a history of fungating breast mass (last biopsied in January 2022-biopsy appeared to be negative despite highly suspicious mass at the time, now it is fungating and eroding through her skin.  Given negative biopsy she did not have it evaluated further but has \"been meaning to have it removed\"), previous right humerus fracture that was managed nonoperatively by orthopedics in late 2022 and early 2023.  At that time it appeared to be healing fairly well based on the care everywhere reports.  Also a history of diabetes.    She was transferred from [place] this afternoon where she presented after she fell at home.  She was found to be in A-fib RVR with rates in the 140s, also in mild DKA with a bicarb of 18 and anion gap of 27.  She was given 2 L of crystalloid, ceftriaxone and azithromycin and started on an insulin infusion.  She also fell onto her right side and has now worsening angulation in her right humerus fracture.  She is currently endorsing full code status.    Code Status  Full Code    Review of Systems  Review of Systems   Constitutional:  Negative for chills, fever and malaise/fatigue.   HENT:  Negative for congestion and sore throat.    Eyes: Negative.    Respiratory:  Negative for sputum production and shortness of breath.    Cardiovascular:  Negative for chest pain and palpitations.   Gastrointestinal:  Negative for abdominal pain, nausea and vomiting.   Genitourinary: Negative.    Musculoskeletal:  Positive for falls.   Skin: Negative.    Neurological:  Positive for weakness. Negative for focal weakness and headaches.   All other systems reviewed and are negative.       Past Medical History   has a past medical history of Humerus fracture and Type 2 diabetes mellitus (HCC).    Surgical " History   has a past surgical history that includes no pertinent past surgical history.    Family History  Reviewed and not pertinent    Social History   reports that she has never smoked. She has never used smokeless tobacco. She reports that she does not drink alcohol and does not use drugs.    Medications  Home Medications    **Home medications have not yet been reviewed for this encounter**         Allergies  Not on File      Vital Signs last 24 hours  Pulse:  [148] 148  Resp:  [21] 21  BP: (96)/(66) 96/66  SpO2:  [91 %] 91 %      Physical Exam   Physical Exam  Vitals and nursing note reviewed. Exam conducted with a chaperone present.   Constitutional:       General: She is awake. She is not in acute distress.     Appearance: Normal appearance. She is underweight. She is ill-appearing.   HENT:      Head: Normocephalic.      Mouth/Throat:      Mouth: Mucous membranes are moist.   Eyes:      Extraocular Movements: Extraocular movements intact.      Pupils: Pupils are equal, round, and reactive to light.   Cardiovascular:      Rate and Rhythm: Tachycardia present. Rhythm irregular.      Pulses: Normal pulses.      Comments: Fungating and eroding breast mass  Pulmonary:      Effort: Pulmonary effort is normal. No respiratory distress.   Abdominal:      General: There is no distension.      Palpations: Abdomen is soft.      Tenderness: There is abdominal tenderness (mild, diffuse, non-focal). There is no guarding or rebound.   Genitourinary:     Comments: Perineum is excoriated (see picture)  Musculoskeletal:         General: Deformity present. Normal range of motion.      Cervical back: Normal range of motion and neck supple.      Comments: She has sensation, pulses and movement in her distal RUE   Skin:     General: Skin is warm and dry.      Capillary Refill: Capillary refill takes less than 2 seconds.   Neurological:      General: No focal deficit present.      Mental Status: She is alert.       Breast  Mass        Perineal excoriations        Fluids  No intake or output data in the 24 hours ending 25 [id number]      Laboratory  Recent Results (from the past 48 hours)   POCT glucose device results    Collection Time: 25  8:03 PM   Result Value Ref Range    POC Glucose, Blood 391 (H) 65 - 99 mg/dL   EKG    Collection Time: 25  8:28 PM   Result Value Ref Range    Report       [facility]    Test Date:  2025  Pt Name:    GERMAIN STROUD               Department: [facility]  MRN:        [id number]                      Room:       [facility]  Gender:     Female                       Technician: KELLY  :        [id number]                   Requested By:ZACHARIAH WILLARD IV  Order #:    [id number]                    Reading MD:    Measurements  Intervals                                Axis  Rate:       165                          P:          0  ND:         0                            QRS:        -61  QRSD:       98                           T:          114  QT:         257  QTc:        426    Interpretive Statements  Atrial fibrillation with rapid V-rate  Left anterior fascicular block  Repolarization abnormality, prob rate related  No previous ECG available for comparison     CBC WITHOUT DIFFERENTIAL    Collection Time: 25  8:40 PM   Result Value Ref Range    WBC 22.8 (H) 4.8 - 10.8 K/uL    RBC 4.54 4.20 - 5.40 M/uL    Hemoglobin 11.3 (L) 12.0 - 16.0 g/dL    Hematocrit 36.2 (L) 37.0 - 47.0 %    MCV 79.7 (L) 81.4 - 97.8 fL    MCH 24.9 (L) 27.0 - 33.0 pg    MCHC 31.2 (L) 32.2 - 35.5 g/dL    RDW 39.7 35.9 - 50.0 fL    Platelet Count 416 164 - 446 K/uL    MPV 9.5 9.0 - 12.9 fL   Comp Metabolic Panel    Collection Time: 25  8:40 PM   Result Value Ref Range    Sodium 137 135 - 145 mmol/L    Potassium 3.5 (L) 3.6 - 5.5 mmol/L    Chloride 97 96 - 112 mmol/L    Co2 21 20 - 33 mmol/L    Anion Gap 19.0 (H) 7.0 - 16.0    Glucose 411 (H) 65 - 99 mg/dL    Bun 39 (H) 8 - 22 mg/dL    Creatinine 1.90 (H) 0.50 - 1.40  mg/dL    Calcium 8.7 8.5 - 10.5 mg/dL    Correct Calcium 9.8 8.5 - 10.5 mg/dL    AST(SGOT) 26 12 - 45 U/L    ALT(SGPT) 23 2 - 50 U/L    Alkaline Phosphatase 59 30 - 99 U/L    Total Bilirubin 0.2 0.1 - 1.5 mg/dL    Albumin 2.6 (L) 3.2 - 4.9 g/dL    Total Protein 6.0 6.0 - 8.2 g/dL    Globulin 3.4 1.9 - 3.5 g/dL    A-G Ratio 0.8 g/dL   MAGNESIUM    Collection Time: 02/23/25  8:40 PM   Result Value Ref Range    Magnesium 2.0 1.5 - 2.5 mg/dL   PHOSPHORUS    Collection Time: 02/23/25  8:40 PM   Result Value Ref Range    Phosphorus 2.9 2.5 - 4.5 mg/dL   LACTIC ACID    Collection Time: 02/23/25  8:40 PM   Result Value Ref Range    Lactic Acid 3.8 (H) 0.5 - 2.0 mmol/L   Prothrombin Time    Collection Time: 02/23/25  8:40 PM   Result Value Ref Range    PT 16.0 (H) 12.0 - 14.6 sec    INR 1.28 (H) 0.87 - 1.13   HEMOGLOBIN A1C    Collection Time: 02/23/25  8:40 PM   Result Value Ref Range    Glycohemoglobin 15.0 (H) 4.0 - 5.6 %    Est Avg Glucose 384 mg/dL   BETA-HYDROXYBUTYRIC ACID    Collection Time: 02/23/25  8:40 PM   Result Value Ref Range    beta-Hydroxybutyric Acid 3.07 (H) 0.02 - 0.27 mmol/L   CORTISOL    Collection Time: 02/23/25  8:40 PM   Result Value Ref Range    Cortisol 108.0 (H) 0.0 - 23.0 ug/dL   TSH WITH REFLEX TO FT4    Collection Time: 02/23/25  8:40 PM   Result Value Ref Range    TSH 0.582 0.380 - 5.330 uIU/mL   ESTIMATED GFR    Collection Time: 02/23/25  8:40 PM   Result Value Ref Range    GFR (CKD-EPI) 26 (A) >60 mL/min/1.73 m 2   HOLD BLOOD BANK SPECIMEN (NOT TESTED)    Collection Time: 02/23/25  8:40 PM   Result Value Ref Range    Holding Tube - Bb DONE    Urinalysis    Collection Time: 02/23/25 10:08 PM    Specimen: Urine, Ibarra Cath   Result Value Ref Range    Micro Urine Req Microscopic          Imaging  EC-ECHOCARDIOGRAM COMPLETE W/O CONT    (Results Pending)         Assessment/Plan  * Atrial fibrillation with rapid ventricular response (HCC)  Assessment & Plan  Unclear chronicity  Therapeutic AC  with heparin infusion    TSH normal  ECHO pending    Given uncertainty of onset will avoid cardioversion if possible  BP too low for metoprolol / diltiazem    Renal function is moderately dysfunctional  - will give 1x dose of digoxin 250mcg and re-evaluate    Can consider cardiology eval for LEONARDO / cardioversion if there is no clinical improvement    Elevated blood ketone body level  Assessment & Plan  BOHB is 3  CO2 21  AG 19    Given her presentation which is concerning for inability to care for herself, I think there is probably a component of starvation ketosis.  For starters I am going to put her on D5 +1/2 NS for some fluids overnight and see if BOHB is better in the morning  - if BOHB is not improved by 6am she'll likely need insulin infusion  - Also, if blood sugar is not controlled with the heparin + mealtime + SSI I ordered she'll need insulin infusion    Will reassess as night goes on but for now will use dextrose fluids    Sepsis with acute renal failure (HCC)  Assessment & Plan  This is Sepsis Present on admission  SIRS criteria identified on my evaluation include: Tachycardia, with heart rate greater than 90 BPM, Tachypnea, with respirations greater than 20 per minute, and Leukocytosis, with WBC greater than 12,000  Clinical indicators of end organ dysfunction include Lactic Acid greater than 2 and Toxic Metabolic Encephalopathy  Source is Unclear, check urine, wound care  Sepsis protocol initiated  Crystalloid Fluid Administration: Fluid resuscitation ordered per standard protocol - 30 mL/kg per current or ideal body weight (plus an additional 1000mL)  IV antibiotics as appropriate for source of sepsis  Reassessment: I have reassessed the patient's hemodynamic status    Blood cultures  Empiric Abx  Follow lactate  Pressors if needed to keep MAP >65    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  Assessment & Plan  Old fracture, now re-broken  Neuro intact  Ortho consult  (ok for them to see tomorrow but we will engage them tonight)  Will make non-weight bearing RUE    Lactic acid acidosis  Assessment & Plan  Sepsis vs malnutrition / thiamine deficiency  Empiric abx   Blood cultures pending  High dose thiamine  trend    Type 2 diabetes mellitus with ketoacidosis without coma, without long-term current use of insulin ([facility])  Assessment & Plan  Not in DKA  A1C 15    Glargine + SSI + mealtime  May need 180 protocol overnight    Check BMP q6 for now (CMP at 0600 will suffice, can have day team re-order if needed)  Replace K, Mg, PO4 per protocol    Renal diet    Mass of right breast  Assessment & Plan  Not acute finding, she has known about this for several years  Biopsy was non-diagnostic    Reasonable to follow up as outpatient as she lives in [place]    Surgical consult would be warranted if she would prefer to seek treatment here, but her acute metabolic issues will take precedence    HERACLIO (acute kidney injury) ([facility])  Assessment & Plan  Strict I/Os  Renally dosed medications  Avoid nephrotoxic agents as able  Trend     Hypokalemia  Assessment & Plan  Check BMP daily  Goal is 4  Replete as necessary     Leukocytosis  Assessment & Plan  Reactive vs infectious  Empiric abx  Blood cultures pending  trend        DVT prophylaxis: Heparin infusion (afib)  PUD prophylaxis: NA  Glycemic control: Glargine + Mealtime + SSI  Nutrition: Renal Diet  Lines: NA  Ibarra: Will place     Discussed patient condition and risk of morbidity and/or mortality with RN, RT, Pharmacy, , Code status disscussed, Charge nurse / hot rounds, and Patient.      The patient remains critically ill.  I am titrating a heparin infusion based on Xa levels.  Critical care time = 64 minutes in directly providing and coordinating critical care and extensive data review.  No time overlap and excludes procedures.

## 2025-02-24 NOTE — THERAPY
Physical Therapy Contact Note    Patient Name: Rohini Salinas  Age:  84 y.o., Sex:  female  Medical Record #: [id number]  Today's Date: 2/24/2025 02/24/25 0849   Interdisciplinary Plan of Care Collaboration   IDT Collaboration with  Nursing   Collaboration Comments PT order received. Spoke with bedside RN who requested to hold evaluation due to medical instability and current mentation. PT will follow up as able.

## 2025-02-24 NOTE — ASSESSMENT & PLAN NOTE
Not acute finding, she has known about this for several years  Biopsy was non-diagnostic  Reasonable to follow up as outpatient as she lives in [place]

## 2025-02-24 NOTE — TREATMENT PLAN
Chart and imaging reviewed, R diaphyseal humerus fx.  acute refracture vs persistent nonunion, no intervening images to determine and current xrays are unrevealing.  Patient has numerous pressing medical issues.  Recommend sling for now, will get patient fitted for a hernandez brace tomorrow.  Plan for preliminary nonoperative treatment.

## 2025-02-24 NOTE — PROGRESS NOTES
Well I just spoke with pharmacy, unfortunately all dextrose fluids are on critical shortage.  Given that her CMP is not severely deranged from acid/base standpoint, I think holding off Dextrose fluids is fine and we will just encourage PO.    She'll need a social work consult as well tomorrow as I have concerns about her ability to care for herself.  Her perineal wounds are suggestive of prolonged periods sitting in her own feces/urine, and her fungating breast mass is advanced.      Plan:  - ok to hold dextrose fluids tonight  - encourage PO  - check BOHB in the morning  - SW consult tomorrow    Iván Merritt M.D.

## 2025-02-24 NOTE — ASSESSMENT & PLAN NOTE
Baseline creatinine 0.7.  Up to 1.8.  Likely related to sepsis.  No indications for renal replacement therapy at present.  Renal US with morphologically normal kidneys.  monitor UOP and electrolytes

## 2025-02-24 NOTE — CARE PLAN
The patient is Watcher - Medium risk of patient condition declining or worsening    Shift Goals  Clinical Goals: stable hemodymics  Patient Goals: comfort  Family Goals: no family present    Progress made toward(s) clinical / shift goals:    Problem: Knowledge Deficit - Standard  Goal: Patient and family/care givers will demonstrate understanding of plan of care, disease process/condition, diagnostic tests and medications  Description: Target End Date:  1-3 days or as soon as patient condition allows    Document in Patient Education    1.  Patient and family/caregiver oriented to unit, equipment, visitation policy and means for communicating concern  2.  Complete/review Learning Assessment  3.  Assess knowledge level of disease process/condition, treatment plan, diagnostic tests and medications  4.  Explain disease process/condition, treatment plan, diagnostic tests and medications  Outcome: Progressing     Problem: Hemodynamics  Goal: Patient's hemodynamics, fluid balance and neurologic status will be stable or improve  Description: Target End Date:  Prior to discharge or change in level of care    Document on Assessment and I/O flowsheet templates    1.  Monitor vital signs, pulse oximetry and cardiac monitor per provider order and/or policy  2.  Maintain blood pressure per provider order  3.  Hemodynamic monitoring per provider order  4.  Manage IV fluids and IV infusions  5.  Monitor intake and output  6.  Daily weights per unit policy or provider order  7.  Assess peripheral pulses and capillary refill  8.  Assess color and body temperature  9.  Position patient for maximum circulation/cardiac output  10. Monitor for signs/symptoms of excessive bleeding  11. Assess mental status, restlessness and changes in level of consciousness  12. Monitor temperature and report fever or hypothermia to provider immediately. Consideration of targeted temperature management.  Reactivated     Problem: Fluid Volume  Goal: Fluid  volume balance will be maintained  Description: Target End Date:  Prior to discharge or change in level of care    Document on I/O flowsheet    1.  Monitor intake and output as ordered  2.  Promote oral intake as appropriate  3.  Report inadequate intake or output to physician  4.  Administer IV therapy as ordered  5.  Weights per provider order  6.  Assess for signs and symptoms of bleeding  7.  Monitor for signs of fluid overload (respiratory changes, edema, weight gain, increased abdominal girth)  8.  Monitor of signs for inadequate fluid volume (poor skin turgor, dry mucous membranes)  9.  Instruct patient on adherence to fluid restrictions  Reactivated     Problem: Urinary - Renal Perfusion  Goal: Ability to achieve and maintain adequate renal perfusion and functioning will improve  Description: Target End Date:  Prior to discharge or change in level of care    Document on I/O and Assessment flowsheet    1.  Urine output will remain greater than 0.5ml/Kg/HR  2.  Monitor amount and/or characteristics of urine per order/policy. Specific gravity per order/policy  3.  Assess signs and symptoms of renal dysfunction  Reactivated     Problem: Physical Regulation  Goal: Diagnostic test results will improve  Description: Target End Date:  Prior to discharge or change in level of care    1.  Monitor lactic acid levels  2.  Monitor ABG's  3.  Monitor diagnostic test results  Reactivated  Goal: Signs and symptoms of infection will decrease  Description: Target End Date:  Prior to discharge or change in level of care    1.  Remove potential routes of infection, such as central lines and urinary catheter  2.  Follow facility protocol for changing IV tubing and sites  3.  Collaborate with Infectious Disease  4.  Antibiotic therapy per provider order  5.  Note drug effects and monitor for antibiotic toxicity  Reactivated     Problem: Skin Integrity  Goal: Skin integrity is maintained or improved  Description: Target End Date:   Prior to discharge or change in level of care    Document interventions on Skin Risk/Andrew flowsheet groups and corresponding LDA    1.  Assess and monitor skin integrity, appearance and/or temperature  2.  Assess risk factors for impaired skin integrity and/or pressures ulcers  3.  Implement precautions to protect skin integrity in collaboration with interdisciplinary team  4.  Implement pressure ulcer prevention protocol if at risk for skin breakdown  5.  Confirm wound care consult if at risk for skin breakdown  6.  Ensure patient use of pressure relieving devices  (Low air loss bed, waffle overlay, heel protectors, ROHO cushion, etc)  Reactivated     Problem: Fall Risk  Goal: Patient will remain free from falls  Description: Target End Date:  Prior to discharge or change in level of care    Document interventions on the [place] Fall Risk Assessment    1.  Assess for fall risk factors  2.  Implement fall precautions  Reactivated     Problem: Pain - Standard  Goal: Alleviation of pain or a reduction in pain to the patient’s comfort goal  Description: Target End Date:  Prior to discharge or change in level of care    Document on Vitals flowsheet    1.  Document pain using the appropriate pain scale per order or unit policy  2.  Educate and implement non-pharmacologic comfort measures (i.e. relaxation, distraction, massage, cold/heat therapy, etc.)  3.  Pain management medications as ordered  4.  Reassess pain after pain med administration per policy  5.  If opiods administered assess patient's response to pain medication is appropriate per POSS sedation scale  6.  Follow pain management plan developed in collaboration with patient and interdisciplinary team (including palliative care or pain specialists if applicable)  Reactivated     Problem: Diabetes Management - DKA  Goal: Patient will achieve and maintain glucose in satisfactory range  Description: 1.  Assess precipitating factors such as infection,  "new-onset diabetes, or poor compliance with treatment regimen  2.  Monitor glucose levels per provider order  3.  Assess for signs and symptoms of hyperglycemia (abdominal pain, bloating, nausea or vomitting)  4.  Assess for signs and symptoms of hypoglycemia (anxiety, tremors, slurred speech, change in LOC, tachycardia, fatigue)  5.  Monitor for early signs and symptoms of infection  6.  Monitor daily weights  7.  Consult to diabetes educator  Reactivated     Problem: Fluid Balance or Risk for Fluid Volume Deficit  Goal: Patient will demonstrate adequate hydration and vital signs  Description: Target End Date:  Prior to discharge or change in level of care    1.  Monitor vital signs - monitor for hypotension and tachycardia, shallow, rapid respirations  2.  Monitor intake and output and report inadequate volumes to provider  3.  Daily weights per provider order  4.  Assess for signs of dehydration - peripheral pulses, capillary refill, color, body temperature, skin turgor, dry skin/mucous membranes  5.  Monitor electrolytes and acid/base balance  6.  Administer IV therapy as ordered  Reactivated     Problem: Nutrition Deficit - Diabetes  Goal: Patient will demonstrate adequate hydration and vital signs  Description: Target End Date:  Prior to discharge or change in level of care    1.  Monitor dietary intake  2.  Albumin and prealbumin per provider order  3.  Enteral and parenteral nutrition per provider order  4.  Monitor body weight  5.  Calorie count  6.  Provide oral care  7.  Collaborate with Clinical Dietician and Diabetes Educator  8.  Provide education on diabetic diet  Reactivated     Problem: Ineffective Renal Perfusion  Goal: Patient will display appropriate urinary output with specific gravity/laboratory studies near normal, stable weight, vital signs with patient's normal range and absence of edema.  Description: 1.  Accurately record intake and output noting to include \"hidden\" fluids such as IV " antibiotic additives, liquid medications, frozen treats, ice chips. Religiously measure gastrointestinal losses and estimate insensible losses (sweating), including  wound drainage, nasogastric outputs, and diarrhea.  2.  Monitor urine specific gravity.  3.  Weigh daily at same time of day, on same scale, with same equipment and clothing.  4.  Assess skin, face, dependent areas for edema.  5.  Monitor heart rate (HR), BP, and JVD/CVP.  6.  Auscultate lung and heart sounds.  7.  Assess level of consciousness. Investigate changes in mentation, presence of restlessness.  8.  Scatter desired beverages throughout the 24-hour period and give various offering (hot, cold, frozen).  Reactivated     Problem: Neuro Status  Goal: Neuro status will remain stable or improve  Description: Target End Date:  Prior to discharge or change in level of care    Document on Neuro assessment in the Assessment flowsheet    1.  Assess and monitor neurologic status per provider order/protocol/unit policy  2.  Assess level of consciousness and orientation  3.  Assess for speech, dysarthria, dysphagia, facial symmetry  4.  Assess visual field, eye movements, gaze preference, pupil reaction and size  5.  Assess muscle strength and motor response in all four extremities  6.  Assess for sensation (numbness and tingling)  7.  Assess basic neuro reflexes (cough, gag, corneal)  8.  Identify changes in neuro status and report to provider for testing/treatment orders  Reactivated     Problem: Knowledge Deficit - Diabetes  Goal: Patient will demonstrate knowledge of insulin injection, symptoms, and treatment of hypoglycemia and diet prior to discharge  Description: Target End Date:  1-3 days or as soon as patient condition allows    1.  Explain disease process, medications and methods used in treating diabetes  2.  Explain signs and symptoms of hyper/hypoglycemia  3.  Educate about lifestyle changes including exercise, diet (carb counting) and sick day  management  4.  Educate importance of regular vision appointments, regular foot care and monitoring of skin lesions  5.  Educate patient and family/caregiver on diabetes management and proper use of equipment for testing (syringe, pens and site rotation, glucometer, test strips)  6.  Collaborate with Diabetes Educator  7.  Assess patient and family/caregiver skills with insulin pens and finger sticks. Document level of understanding in patient education  Reactivated

## 2025-02-24 NOTE — ASSESSMENT & PLAN NOTE
Unclear chronicity. TSH normal. Unknown onset - likely associated with sepsis.  Converted to sinus with amiodarone.  Discussed risk/benefit with long term anticoagulation - she wishes to think about it more.  She last fell several months ago, and lives home alone, gets around with a walker  Therapeutic AC with heparin infusion  Replete K and Mg  DC amiodarone infusion  Echo ordered

## 2025-02-24 NOTE — PROGRESS NOTES
Critical Care Progress Note    Date of admission  2/23/2025    Chief Complaint  84 y.o. female admitted 2/23/2025 with DKA, AF with RVR and sepsis.    Hospital Course  84 y.o. female with history of diabetes, breast mass, right humerus fracture in 2022 nonoperatively who presented from [facility] on 2/23 after she fell at home.  She is found to have atrial fibrillation with RVR as well as DKA.  UA consistent with UTI.    Interval Problem Update  Events in last 24 hours: transferred from [facility]  N: A&O x3 but drowsy, slow to respond  CV: Atrial fibrillation with RVR, rate 110-170 on 0.75 phenylephrine. Lactate 2.6 and downtrending  R: room air  FEN/GI: NPO due to mentation.  BMx2  /Renal: Cr 1.8, baseline 0.7  I/O net +1L over last 24 hours. Acidosis rsolved.  Urine with sediment.  Ibarra in place.  Heme: hemoglobin 11.5, stable. Platelets 500K.  Heparin infusion.  ID: WBC 29K, uptrending.  Empiric CTX + azithro, blood cultures 2/23 pending.  UA with pyuria.  Tm 37.7C  Skin/MSK: R humerus fracture - prelim plan for non-operative management. Good perfusion  Endo: SSI + glargine    Family: daughter Stacy unable to be reached by phone.    Home medications reconciled       Review of Systems  ROS     Vital Signs for last 24 hours   Pulse:  [] 88  Resp:  [15-46] 17  BP: ()/(34-72) 106/59  SpO2:  [90 %-99 %] 98 %    Hemodynamic parameters for last 24 hours       Respiratory Information for the last 24 hours       Physical Exam   Physical Exam  Vitals and nursing note reviewed.   Constitutional:       General: She is not in acute distress.     Appearance: She is not toxic-appearing.   HENT:      Head: Normocephalic and atraumatic.   Cardiovascular:      Rate and Rhythm: Normal rate and regular rhythm.   Pulmonary:      Effort: No respiratory distress.   Abdominal:      General: There is no distension.      Palpations: Abdomen is soft.   Musculoskeletal:         General: No swelling.      Comments: ANTOINE  deformity    Palpable radial pulse   Skin:     General: Skin is warm.   Neurological:      Mental Status: She is alert.      Comments: Awake, conversing, fluent speech  Moves all four extremities including RUE         Medications  Current Facility-Administered Medications   Medication Dose Route Frequency Provider Last Rate Last Admin    amiodarone (Nexterone) 360 mg/200 mL infusion  1 mg/min Intravenous Once Flavio Russ M.D. 33.3 mL/hr at 02/24/25 0826 1 mg/min at 02/24/25 0826    Followed by    amiodarone (Nexterone) 360 mg/200 mL infusion  0.5 mg/min Intravenous Continuous Flavio Russ M.D.        dextrose 5% infusion   Intravenous Continuous Flavio Russ M.D. 30 mL/hr at 02/24/25 0802 New Bag at 02/24/25 0802    nystatin (Mycostatin) powder   Topical BID Flavio Russ M.D.   Given at 02/24/25 0857    sodium phosphate 15 mmol in dextrose 5% 250 mL ivpb  15 mmol Intravenous Once Flavio Russ M.D. 83.3 mL/hr at 02/24/25 1100 15 mmol at 02/24/25 1100    phenylephrine 40 mg/250 mL NS premix  0-5 mcg/kg/min Intravenous Continuous Iván Merritt M.D. 13.1 mL/hr at 02/24/25 0647 0.75 mcg/kg/min at 02/24/25 0647    MD Alert...ICU Electrolyte Replacement per Pharmacy   Other PHARMACY TO DOSE Iván Merritt M.D.        acetaminophen (Tylenol) tablet 650 mg  650 mg Oral Q6HRS PRN Iván Merritt M.D.        senna-docusate (Pericolace Or Senokot S) 8.6-50 MG per tablet 2 Tablet  2 Tablet Oral Q EVENING Iván Merritt M.D.        And    polyethylene glycol/lytes (Miralax) Packet 1 Packet  1 Packet Oral QDAY PRN Iván Merritt M.D.        cefTRIAXone (Rocephin) syringe 2,000 mg  2,000 mg Intravenous DAILY AT 1800 Iván Merritt M.D.        azithromycin (ZITHROMAX) 500 mg in D5W 250 mL IVPB premix  500 mg Intravenous DAILY AT 1800 Iván Merritt M.D.        thiamine (B-1) injection 200 mg  200 mg Intravenous BID Iván Merritt M.D.   200 mg at 02/24/25 0607    Followed by    [START ON 2/25/2025] thiamine (B-1)  injection 100 mg  100 mg Intravenous DAILY Iván Merritt M.D.        insulin GLARGINE (Lantus,Semglee) injection  10 Units Subcutaneous Q EVENING Iván Merritt M.D.   10 Units at 02/23/25 2236    And    insulin lispro (HumaLOG,AdmeLOG) subcutaneous injection  0.2 Units/kg/day Subcutaneous TID AC Iván Merritt M.D.   3 Units at 02/24/25 0616    And    insulin lispro (HumaLOG,AdmeLOG) subcutaneous injection  3-14 Units Subcutaneous 4X/DAY ACHS Iván Merritt M.D.   12 Units at 02/23/25 2247    And    dextrose 50% (D50W) injection 25 g  25 g Intravenous Q15 MIN PRN Iván Merritt M.D.        heparin infusion 25,000 units in 500 mL 0.45% NACL  0-30 Units/kg/hr Intravenous Continuous Iván Merritt M.D. 16.8 mL/hr at 02/24/25 0832 18 Units/kg/hr at 02/24/25 0832    heparin injection 1,900 Units  40 Units/kg Intravenous PRN Iván Merritt M.D.        midodrine (Proamatine) tablet 15 mg  15 mg Oral TID WITH MEALS Iván Merritt M.D.   15 mg at 02/23/25 2316       Fluids    Intake/Output Summary (Last 24 hours) at 2/24/2025 1149  Last data filed at 2/24/2025 0800  Gross per 24 hour   Intake 1185.53 ml   Output 345 ml   Net 840.53 ml       Laboratory          Recent Labs     02/23/25 2040 02/24/25  0613   SODIUM 137 138   POTASSIUM 3.5* 4.0   CHLORIDE 97 103   CO2 21 25   BUN 39* 39*   CREATININE 1.90* 1.78*   MAGNESIUM 2.0 2.0   PHOSPHORUS 2.9 2.0*   CALCIUM 8.7 8.7     Recent Labs     02/23/25 2040 02/24/25  0613   ALTSGPT 23 22   ASTSGOT 26 23   ALKPHOSPHAT 59 71   TBILIRUBIN 0.2 <0.2   GLUCOSE 411* 189*     Recent Labs     02/23/25 2040 02/24/25  0613   WBC 22.8* 29.2*   ASTSGOT 26 23   ALTSGPT 23 22   ALKPHOSPHAT 59 71   TBILIRUBIN 0.2 <0.2     Recent Labs     02/23/25  2040 02/23/25  2233 02/24/25  0613   RBC 4.54  --  4.63   HEMOGLOBIN 11.3*  --  11.5*   HEMATOCRIT 36.2*  --  36.7*   PLATELETCT 416  --  515*   PROTHROMBTM 16.0* 15.8*  --    APTT  --  24.2*  --    INR 1.28* 1.26*  --        Assessment/Plan  *  Atrial fibrillation with rapid ventricular response (HCC)  Assessment & Plan  Unclear chronicity. TSH normal. Unknown onset - likely associated with sepsis.  No improvement with 250mcg digoxin  Therapeutic AC with heparin infusion  Replete K and Mg  Amiodarone infusion  Echo pending  Consider LEONARDO / cardioversion if there is no clinical improvement    HERACLIO (acute kidney injury) (HCC)  Assessment & Plan  Baseline creatinine 0.7.  Up to 1.8.  Likely related to sepsis.  No indications for renal replacement therapy at present.  monitor UOP and electrolytes  Renal US      Sepsis with acute renal failure (HCC)  Assessment & Plan  This is Sepsis Present on admission  SIRS criteria identified on my evaluation include: Tachycardia, with heart rate greater than 90 BPM, Tachypnea, with respirations greater than 20 per minute, and Leukocytosis, with WBC greater than 12,000  Clinical indicators of end organ dysfunction include Lactic Acid greater than 2 and Toxic Metabolic Encephalopathy  Source is likely urinary  Sepsis protocol initiated  Crystalloid Fluid Administration: Fluid resuscitation ordered per standard protocol - 30 mL/kg per current or ideal body weight (plus an additional 1000mL)  IV antibiotics as appropriate for source of sepsis  Reassessment: I have reassessed the patient's hemodynamic status    Blood cultures  Urine culture  Empiric ceftriaxone/azithromycin  Phenylephrine for MAP >65  Trend lactate/end organ perfusion    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  Neurovascularly intact  Ortho consult - prelim sling and non-operative    Type 2 diabetes mellitus with ketoacidosis without coma, without long-term current use of insulin ([facility])  Assessment & Plan  A1C 15  Glargine + SSI + mealtime  Replace K, Mg, PO4 per protocol    Renal diet    Mass of right breast  Assessment & Plan  Not acute finding, she has known about this for several  years  Biopsy was non-diagnostic  Reasonable to follow up as outpatient as she lives in [place]         VTE:  Heparin  Ulcer: Not Indicated  Lines: Ibarra Catheter  Ongoing indication addressed    Critical care time: 70 minutes, excluding procedure.    Flavio Russ MD, [facility]  Critical Care Medicine  7:22 AM

## 2025-02-24 NOTE — ASSESSMENT & PLAN NOTE
This is Sepsis Present on admission  SIRS criteria identified on my evaluation include: Tachycardia, with heart rate greater than 90 BPM, Tachypnea, with respirations greater than 20 per minute, and Leukocytosis, with WBC greater than 12,000  Clinical indicators of end organ dysfunction include Lactic Acid greater than 2 and Toxic Metabolic Encephalopathy  Source is likely urinary  Sepsis protocol initiated  Crystalloid Fluid Administration: Fluid resuscitation ordered per standard protocol - 30 mL/kg per current or ideal body weight (plus an additional 1000mL)  IV antibiotics as appropriate for source of sepsis  Reassessment: I have reassessed the patient's hemodynamic status    Blood cultures  Urine culture  Empiric ceftriaxone   Phenylephrine for MAP >65  Trend lactate/end organ perfusion

## 2025-02-24 NOTE — ASSESSMENT & PLAN NOTE
Non-union vs refracture.  Neurovascularly intact.  Having new pain.  Ortho consult - prelim sling and non-operative

## 2025-02-24 NOTE — WOUND TEAM
[facility]  Inpatient Services  Initial Wound and Skin Care Evaluation    Admission Date: 2/23/2025     Last order of IP CONSULT TO WOUND CARE was found on 2/23/2025 from Hospital Encounter on 2/23/2025     HPI, PMH, SH: Reviewed    Past Surgical History:   Procedure Laterality Date    NO PERTINENT PAST SURGICAL HISTORY       Social History     Tobacco Use    Smoking status: Never    Smokeless tobacco: Never   Substance Use Topics    Alcohol use: Never     No chief complaint on file.    Diagnosis: Atrial fibrillation with rapid ventricular response (HCC) [I48.91]    Unit where seen by Wound Team: T920/01     WOUND CONSULT RELATED TO:  Right breast mass, eve-area, groin, right hip, skin tear right hand    WOUND TEAM PLAN OF CARE - Frequency of Follow-up:   Nursing to follow dressing orders written for wound care. 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)   Date First Assessed/Time First Assessed: 02/24/25 1434   Primary Wound Type: Neoplasm  Location: Breast  Laterality: Right      Assessments 2/24/2025 11:30 AM   Wound Image       Site Assessment Red;Black;Drainage;Painful   Periwound Assessment Pink   Margins Defined edges   Closure Secondary intention   Drainage Amount Moderate   Drainage Description Sanguineous   Treatments Cleansed;Nonselective debridement;Site care   Wound Cleansing Approved Wound Cleanser   Periwound Protectant No-sting Skin Prep   Dressing Status Clean;Dry;Intact   Dressing Changed New   Dressing Cleansing/Solutions Not Applicable   Dressing Options Petrolatum Gauze (yellow);Absorbent Abdominal Pad;Hypafix  Tape   Dressing Change/Treatment Frequency Daily, and As Needed   NEXT Dressing Change/Treatment Date 02/25/25   NEXT Weekly Photo (Inpatient Only) 03/03/25   Wound Team Following Weekly   Non-staged Wound Description Full thickness   Wound Length (cm) 8 cm   Wound Width (cm) 6.5 cm   Wound Surface Area (cm^2) 52 cm^2   Shape Irregular   Wound Odor None        Vascular:    FORTINO:   No results found.    Lab Values:    Lab Results   Component Value Date/Time    WBC 29.2 (H) 02/24/2025 06:13 AM    RBC 4.63 02/24/2025 06:13 AM    HEMOGLOBIN 11.5 (L) 02/24/2025 06:13 AM    HEMATOCRIT 36.7 (L) 02/24/2025 06:13 AM    HBA1C 15.0 (H) 02/23/2025 08:40 PM         Culture Results show:  No results found for this or any previous visit (from the past 720 hours).    Pain Level/Medicated:  None, Tolerated without pain medication       INTERVENTIONS BY WOUND TEAM:  Chart and images reviewed. Discussed with bedside RN. All areas of concern (based on picture review, LDA review and discussion with bedside RN) have been thoroughly assessed. Documentation of areas based on significant findings. This RN in to assess patient. Performed standard wound care which includes appropriate positioning, dressing removal and non-selective debridement. Pictures and measurements obtained weekly if/when required.    Wound:  Right Breast  Preparation for Dressing removal: Dressing soaked with saline  Cleansed/Non-selectively Debrided with:  Wound cleanser and Gauze  Brielle wound: Cleansed with Wound cleanser and Gauze, Prepped with No Sting  Primary Dressing:  Xeroform  Secondary (Outer) Dressing: ABD pad, hypafix tape    Advanced Wound Care Discharge Planning  Number of Clinicians necessary to complete wound care: 1  Is patient requiring IV pain medications for dressing changes:  No   Length of time for dressing change 20 min. (This does not include chart review, pre-medication time, set up, clean up or time spent charting.)    Interdisciplinary  consultation: Patient, Bedside RN, Yoli PASRTANA (Wound RN).  Pressure injury and staging reviewed with N/A.    EVALUATION / RATIONALE FOR TREATMENT:     Date:  02/24/25  Wound Status:  Initial evaluation    Right breast mass is red and black with a moderate amount of sanguinous drainage. Mass is large and irregular with a fungal appearing component. No odor noted on this assessment. Xeroform (yellow) applied to provide an occlusive dressing that incorporates bacteriostatic protection. Covered with abd pad and hypafix tape. Recommend oncology consult.     Sacrum is red and slow to rosie. Re-applied sacral offloading dressing. Sacrum is a high risk bony prominence due to patient's body habitus. Continue with offloading measures and Q2T..     BL Heels are intact and blanching. Continue with offloading measures.     Groin and eve-area with fungal and moisture associated skin damage. Anti-fungal therapies initiated and ordered. If no improvement in the coming days, recommend IV fungal therapy.     Patient with skin tears on BL arms left open to air. May apply mepitel one PRN to decrease further skin breakdown. Patient with scattered bruising and abrasions. Offload as needed.          Goals: Steady decrease in wound area and depth weekly.    NURSING PLAN OF CARE ORDERS:  Dressing changes: See Dressing Care orders  RN Prevention Protocol    NUTRITION RECOMMENDATIONS   Wound Team Recommendations:  N/A    DIET ORDERS (From admission to next 24h)       Start     Ordered    02/23/25 2216  Diet Order Diet: Renal  ALL MEALS        Question:  Diet:  Answer:  Renal    02/23/25 2218                    PREVENTATIVE INTERVENTIONS:    Q shift Andrew - performed per nursing policy  Q shift pressure point assessments - performed per nursing policy    Surface/Positioning  ICU Low Airloss - Currently in Place  Reposition q 2 hours - Currently in Place  TAPs Turning system - Currently in Place    Offloading/Redistribution  Sacral offloading  dressing (Silicone dressing) - Currently in Place  Heel offloading dressing (Silicone dressing) - Currently in Place  Heel float boots (Prevalon boot) - Currently in Place      Containment/Moisture Prevention    Dri-kasia pad - Currently in Place  Ibarra Catheter - Currently in Place  Antifungal treatment - Currently in Place    Anticipated discharge plans:  TBD        Vac Discharge Needs:  Vac Discharge plan is purely a recommendation from wound team and not a requirement for discharge unless otherwise stated by physician.  Not Applicable Pt not on a wound vac

## 2025-02-24 NOTE — PROGRESS NOTES
Pt arrival to T920 at 1952     Vitals:   BP: 96/65  Temp 97.8 f   HR: 150  RR: 22  SaO2: 97 % on 3 L  Wt: 46.6 kg     _____________________________________________________________   4 Eyes Skin Assessment Completed by FELISHA Ro and FELISHA Rea.    Head Bruising and Swelling; Left black eye  Ears Redness and Blanching  Nose WDL  Mouth Redness and Discoloration; Abrasion  Neck WDL  Breast/Chest Bruising and Discoloration; Mass to Right Breast  Shoulder Blades Redness and Blanching  Spine Redness and Blanching  (R) Arm/Elbow/Hand Redness and Discoloration; skin tear to right hand; Splint brace to right arm  (L) Arm/Elbow/Hand Bruising  Abdomen Scar-crusted/healing  Groin Redness and Excoriation  Scrotum/Coccyx/Buttocks Redness, Blanching, Excoriation, Moisture Fissure, and Discoloration  (R) Leg Redness, Blanching, and Bruising; Dry Flaky skin  (L) Leg Redness, Blanching, and Abrasion; Dry Flaky skin  (R) Heel/Foot/Toe Redness and Blanching; Dry flaky skin  (L) Heel/Foot/Toe Redness and Blanching; Dry flaky skin      Devices In Places ECG, Blood Pressure Cuff, Pulse Ox, Ibarra, SCD's, and Nasal Cannula      Interventions In Place NC W/Ear Foams, Heel Mepilex, Sacral Mepilex, Heel Float Boots, TAP System, Pillows, Q2 Turns, Low Air Loss Mattress, and ZFlo Pillow    Possible Skin Injury Yes    Pictures Uploaded Into Epic Yes  Wound Consult Placed Yes  RN Wound Prevention Protocol Ordered Yes     Belonging: N/A

## 2025-02-24 NOTE — DISCHARGE PLANNING
NOK/Emergency Contact is Pt's daughter, Stacy [phone] NO AD or ACP docs    Met with Pt's daughter at bedside.Pt lives in [place] alone in an ADA approved  Senior Living  type of apartment. She has a FWW, is independent with her ADLs but requires help with shopping, finances, and med management. Pt relies on her daughter to drive to appointments, get her groceries and do her finances.  Daughter describes her mother as having a history on non compliance with her PCP. Stacy says her mother was Dx with Borderline Personality D/O and Schizo effective D/O 'many years ago'. Pt often has auditory hallucinations per Dtr. She says she cannot sleep at night because of 'the people calling my name outside' among other thoughts of paranoia that include people having 'electric energy to stop the noises'.  She has never been compliant with Psychiatric care either.      Pt has a son in [place], who can be reached by phone but has no interaction with his Mother. Stacy is the only family member in contact with Pt. She has been  for over 30 years and lived alone. Stacy is receptive to having a Palliative Consult for Pt.     Pt receives $1650.00 monthly, has Medicare A&B, does not qualify for Medicaid.    Care Transition Team Assessment    Information Source  Orientation Level: Unable to assess  Information Given By: Relative  Informant's Name: Stacy-Dtr at bedside  Who is responsible for making decisions for patient? : Patient    Readmission Evaluation  Is this a readmission?: No    Elopement Risk  Legal Hold: No  Ambulatory or Self Mobile in Wheelchair: No-Not an Elopement Risk  Elopement Risk: Not at Risk for Elopement    Discharge Preparedness  What is your plan after discharge?: Uncertain - pending medical team collaboration  What are your discharge supports?: Child  Prior Functional Level: Ambulatory, Independent with Activities of Daily Living, Independent with Medication Management, Uses  Walker  Difficulity with ADLs: Walking  Difficulity with IADLs: Driving, Keeping track of finances, Shopping, Using the telephone or computer    Functional Assesment  Prior Functional Level: Ambulatory, Independent with Activities of Daily Living, Independent with Medication Management, Uses Walker    Finances  Financial Barriers to Discharge: No  Prescription Coverage: Yes    Vision / Hearing Impairment  Right Eye Vision: Impaired, Wears Glasses (glasses not available)  Left Eye Vision: Impaired, Wears Glasses (glasses not available)  Hearing Impairment: Both Ears, Hearing Device Not Available       Advance Directive  Advance Directive?: None, Clinically incapacitated / Inappropriate    Psychological Assessment  History of Substance Abuse: None  History of Psychiatric Problems: Yes (Borderline Personality D/O, Schizo effective)  Non-compliant with Treatment: Yes  Newly Diagnosed Illness: Yes    Discharge Risks or Barriers  Discharge risks or barriers?: No PCP    Anticipated Discharge Information  Discharge Disposition: Hospice/Home/Medical Facility   Discharge Address: [address] [place] [address]  Discharge Contact Phone Number: Stacy ()

## 2025-02-24 NOTE — ED NOTES
Med Rec complete per pharmacy   Antibiotics in the past 30 days:no  Anticoagulant in past 14 days:no  Pharmacy patient utilizes:[facility] in [place], [place]    Attempted to contact daughter on demographics but mobile line was no longer in service and work number went to     Contacted pharmacy per historical and per pharmacy patient has not dispensed any medications in past year and a half    Removed medications on med rec due to outdated prescriptions from 2022

## 2025-02-24 NOTE — PROGRESS NOTES
Order for GABE hernandez brace is now being submitted to [facility] being that [facility] at this time does not have the correct size.If any questions attention of this order [facility] can be contacted at [phone].

## 2025-02-25 PROBLEM — D50.9 MICROCYTIC ANEMIA: Status: ACTIVE | Noted: 2025-02-25

## 2025-02-25 PROBLEM — E11.65 TYPE 2 DIABETES MELLITUS WITH HYPERGLYCEMIA, WITHOUT LONG-TERM CURRENT USE OF INSULIN (HCC): Status: ACTIVE | Noted: 2025-02-23

## 2025-02-25 PROBLEM — Z71.89 ADVANCE CARE PLANNING: Status: ACTIVE | Noted: 2025-02-25

## 2025-02-25 LAB
ALBUMIN SERPL BCP-MCNC: 2.2 G/DL (ref 3.2–4.9)
ALBUMIN/GLOB SERPL: 0.6 G/DL
ALP SERPL-CCNC: 55 U/L (ref 30–99)
ALT SERPL-CCNC: 21 U/L (ref 2–50)
ANION GAP SERPL CALC-SCNC: 10 MMOL/L (ref 7–16)
AST SERPL-CCNC: 24 U/L (ref 12–45)
B-OH-BUTYR SERPL-MCNC: 0.39 MMOL/L (ref 0.02–0.27)
BILIRUB SERPL-MCNC: <0.2 MG/DL (ref 0.1–1.5)
BUN SERPL-MCNC: 35 MG/DL (ref 8–22)
C DIFF TOX GENS STL QL NAA+PROBE: NEGATIVE
CALCIUM ALBUM COR SERPL-MCNC: 9.7 MG/DL (ref 8.5–10.5)
CALCIUM SERPL-MCNC: 8.3 MG/DL (ref 8.5–10.5)
CHLORIDE SERPL-SCNC: 101 MMOL/L (ref 96–112)
CO2 SERPL-SCNC: 24 MMOL/L (ref 20–33)
CREAT SERPL-MCNC: 1.5 MG/DL (ref 0.5–1.4)
ERYTHROCYTE [DISTWIDTH] IN BLOOD BY AUTOMATED COUNT: 40.7 FL (ref 35.9–50)
GFR SERPLBLD CREATININE-BSD FMLA CKD-EPI: 34 ML/MIN/1.73 M 2
GLOBULIN SER CALC-MCNC: 3.4 G/DL (ref 1.9–3.5)
GLUCOSE BLD STRIP.AUTO-MCNC: 129 MG/DL (ref 65–99)
GLUCOSE BLD STRIP.AUTO-MCNC: 130 MG/DL (ref 65–99)
GLUCOSE BLD STRIP.AUTO-MCNC: 138 MG/DL (ref 65–99)
GLUCOSE BLD STRIP.AUTO-MCNC: 138 MG/DL (ref 65–99)
GLUCOSE BLD STRIP.AUTO-MCNC: 143 MG/DL (ref 65–99)
GLUCOSE SERPL-MCNC: 122 MG/DL (ref 65–99)
HCT VFR BLD AUTO: 32 % (ref 37–47)
HEMOCCULT STL QL: POSITIVE
HGB BLD-MCNC: 9.8 G/DL (ref 12–16)
MAGNESIUM SERPL-MCNC: 1.7 MG/DL (ref 1.5–2.5)
MCH RBC QN AUTO: 24.3 PG (ref 27–33)
MCHC RBC AUTO-ENTMCNC: 30.6 G/DL (ref 32.2–35.5)
MCV RBC AUTO: 79.2 FL (ref 81.4–97.8)
PHOSPHATE SERPL-MCNC: 2.7 MG/DL (ref 2.5–4.5)
PLATELET # BLD AUTO: 343 K/UL (ref 164–446)
PMV BLD AUTO: 9.5 FL (ref 9–12.9)
POTASSIUM SERPL-SCNC: 3.2 MMOL/L (ref 3.6–5.5)
PROT SERPL-MCNC: 5.6 G/DL (ref 6–8.2)
RBC # BLD AUTO: 4.04 M/UL (ref 4.2–5.4)
SODIUM SERPL-SCNC: 135 MMOL/L (ref 135–145)
UFH PPP CHRO-ACNC: 0.36 IU/ML
WBC # BLD AUTO: 19.4 K/UL (ref 4.8–10.8)

## 2025-02-25 PROCEDURE — 700111 HCHG RX REV CODE 636 W/ 250 OVERRIDE (IP): Performed by: STUDENT IN AN ORGANIZED HEALTH CARE EDUCATION/TRAINING PROGRAM

## 2025-02-25 PROCEDURE — 99233 SBSQ HOSP IP/OBS HIGH 50: CPT | Performed by: EMERGENCY MEDICINE

## 2025-02-25 PROCEDURE — 82010 KETONE BODYS QUAN: CPT

## 2025-02-25 PROCEDURE — 700111 HCHG RX REV CODE 636 W/ 250 OVERRIDE (IP)

## 2025-02-25 PROCEDURE — 83735 ASSAY OF MAGNESIUM: CPT

## 2025-02-25 PROCEDURE — 97167 OT EVAL HIGH COMPLEX 60 MIN: CPT

## 2025-02-25 PROCEDURE — 700111 HCHG RX REV CODE 636 W/ 250 OVERRIDE (IP): Performed by: EMERGENCY MEDICINE

## 2025-02-25 PROCEDURE — 700105 HCHG RX REV CODE 258: Performed by: INTERNAL MEDICINE

## 2025-02-25 PROCEDURE — 85520 HEPARIN ASSAY: CPT

## 2025-02-25 PROCEDURE — A9270 NON-COVERED ITEM OR SERVICE: HCPCS | Performed by: STUDENT IN AN ORGANIZED HEALTH CARE EDUCATION/TRAINING PROGRAM

## 2025-02-25 PROCEDURE — A9270 NON-COVERED ITEM OR SERVICE: HCPCS | Performed by: INTERNAL MEDICINE

## 2025-02-25 PROCEDURE — 700102 HCHG RX REV CODE 250 W/ 637 OVERRIDE(OP): Performed by: EMERGENCY MEDICINE

## 2025-02-25 PROCEDURE — 700102 HCHG RX REV CODE 250 W/ 637 OVERRIDE(OP): Performed by: STUDENT IN AN ORGANIZED HEALTH CARE EDUCATION/TRAINING PROGRAM

## 2025-02-25 PROCEDURE — 99497 ADVNCD CARE PLAN 30 MIN: CPT | Performed by: INTERNAL MEDICINE

## 2025-02-25 PROCEDURE — 99223 1ST HOSP IP/OBS HIGH 75: CPT | Mod: 25 | Performed by: INTERNAL MEDICINE

## 2025-02-25 PROCEDURE — 85027 COMPLETE CBC AUTOMATED: CPT

## 2025-02-25 PROCEDURE — 84100 ASSAY OF PHOSPHORUS: CPT

## 2025-02-25 PROCEDURE — A9270 NON-COVERED ITEM OR SERVICE: HCPCS | Performed by: EMERGENCY MEDICINE

## 2025-02-25 PROCEDURE — 82962 GLUCOSE BLOOD TEST: CPT

## 2025-02-25 PROCEDURE — 87493 C DIFF AMPLIFIED PROBE: CPT

## 2025-02-25 PROCEDURE — 700102 HCHG RX REV CODE 250 W/ 637 OVERRIDE(OP): Performed by: INTERNAL MEDICINE

## 2025-02-25 PROCEDURE — 700111 HCHG RX REV CODE 636 W/ 250 OVERRIDE (IP): Performed by: INTERNAL MEDICINE

## 2025-02-25 PROCEDURE — 82272 OCCULT BLD FECES 1-3 TESTS: CPT

## 2025-02-25 PROCEDURE — 99498 ADVNCD CARE PLAN ADDL 30 MIN: CPT | Performed by: INTERNAL MEDICINE

## 2025-02-25 PROCEDURE — 80053 COMPREHEN METABOLIC PANEL: CPT

## 2025-02-25 PROCEDURE — 97163 PT EVAL HIGH COMPLEX 45 MIN: CPT

## 2025-02-25 PROCEDURE — 770020 HCHG ROOM/CARE - TELE (206)

## 2025-02-25 RX ORDER — AZITHROMYCIN 500 MG/5ML
500 INJECTION, POWDER, LYOPHILIZED, FOR SOLUTION INTRAVENOUS DAILY
Status: DISCONTINUED | OUTPATIENT
Start: 2025-02-25 | End: 2025-02-25

## 2025-02-25 RX ORDER — POTASSIUM CHLORIDE 1500 MG/1
40 TABLET, EXTENDED RELEASE ORAL 2 TIMES DAILY
Status: COMPLETED | OUTPATIENT
Start: 2025-02-25 | End: 2025-02-26

## 2025-02-25 RX ORDER — MAGNESIUM SULFATE HEPTAHYDRATE 40 MG/ML
2 INJECTION, SOLUTION INTRAVENOUS ONCE
Status: COMPLETED | OUTPATIENT
Start: 2025-02-25 | End: 2025-02-25

## 2025-02-25 RX ORDER — POTASSIUM CHLORIDE 1500 MG/1
40 TABLET, EXTENDED RELEASE ORAL ONCE
Status: COMPLETED | OUTPATIENT
Start: 2025-02-25 | End: 2025-02-25

## 2025-02-25 RX ORDER — PANTOPRAZOLE SODIUM 40 MG/10ML
40 INJECTION, POWDER, LYOPHILIZED, FOR SOLUTION INTRAVENOUS 2 TIMES DAILY
Status: DISCONTINUED | OUTPATIENT
Start: 2025-02-25 | End: 2025-02-26

## 2025-02-25 RX ADMIN — POTASSIUM CHLORIDE 40 MEQ: 1500 TABLET, EXTENDED RELEASE ORAL at 09:19

## 2025-02-25 RX ADMIN — CEFTRIAXONE SODIUM 2000 MG: 10 INJECTION, POWDER, FOR SOLUTION INTRAVENOUS at 19:29

## 2025-02-25 RX ADMIN — THIAMINE HYDROCHLORIDE 100 MG: 100 INJECTION, SOLUTION INTRAMUSCULAR; INTRAVENOUS at 05:06

## 2025-02-25 RX ADMIN — HEPARIN SODIUM 20 UNITS/KG/HR: 5000 INJECTION, SOLUTION INTRAVENOUS at 03:35

## 2025-02-25 RX ADMIN — MICONAZOLE NITRATE: 2 CREAM TOPICAL at 05:07

## 2025-02-25 RX ADMIN — MIDODRINE HYDROCHLORIDE 15 MG: 5 TABLET ORAL at 21:11

## 2025-02-25 RX ADMIN — PANTOPRAZOLE SODIUM 40 MG: 40 INJECTION, POWDER, FOR SOLUTION INTRAVENOUS at 22:46

## 2025-02-25 RX ADMIN — MICONAZOLE NITRATE: 2 CREAM TOPICAL at 18:02

## 2025-02-25 RX ADMIN — MAGNESIUM SULFATE HEPTAHYDRATE 2 G: 2 INJECTION, SOLUTION INTRAVENOUS at 09:19

## 2025-02-25 RX ADMIN — INSULIN GLARGINE-YFGN 10 UNITS: 100 INJECTION, SOLUTION SUBCUTANEOUS at 19:28

## 2025-02-25 RX ADMIN — MIDODRINE HYDROCHLORIDE 15 MG: 5 TABLET ORAL at 13:00

## 2025-02-25 RX ADMIN — APIXABAN 2.5 MG: 5 TABLET, FILM COATED ORAL at 18:02

## 2025-02-25 RX ADMIN — AMIODARONE HYDROCHLORIDE 0.5 MG/MIN: 1.8 INJECTION, SOLUTION INTRAVENOUS at 00:12

## 2025-02-25 RX ADMIN — MIDODRINE HYDROCHLORIDE 15 MG: 5 TABLET ORAL at 08:12

## 2025-02-25 RX ADMIN — POTASSIUM CHLORIDE 40 MEQ: 1500 TABLET, EXTENDED RELEASE ORAL at 21:10

## 2025-02-25 ASSESSMENT — ENCOUNTER SYMPTOMS
MYALGIAS: 0
COUGH: 0
NAUSEA: 0
TINGLING: 0
FALLS: 0
SHORTNESS OF BREATH: 0
HEADACHES: 0
DIARRHEA: 0
DIZZINESS: 0
SPUTUM PRODUCTION: 0
VOMITING: 0
PALPITATIONS: 0
LOSS OF CONSCIOUSNESS: 0
ROS SKIN COMMENTS: RIGHT BREAST MASS
DEPRESSION: 0
WEAKNESS: 0
ABDOMINAL PAIN: 0
CHILLS: 0
CONSTIPATION: 0
STRIDOR: 0
FEVER: 0

## 2025-02-25 ASSESSMENT — PAIN DESCRIPTION - PAIN TYPE
TYPE: ACUTE PAIN

## 2025-02-25 ASSESSMENT — COGNITIVE AND FUNCTIONAL STATUS - GENERAL
STANDING UP FROM CHAIR USING ARMS: A LOT
EATING MEALS: A LITTLE
TURNING FROM BACK TO SIDE WHILE IN FLAT BAD: A LOT
MOVING FROM LYING ON BACK TO SITTING ON SIDE OF FLAT BED: A LOT
SUGGESTED CMS G CODE MODIFIER MOBILITY: CL
DRESSING REGULAR LOWER BODY CLOTHING: A LOT
PERSONAL GROOMING: A LOT
HELP NEEDED FOR BATHING: A LOT
TOILETING: A LOT
CLIMB 3 TO 5 STEPS WITH RAILING: TOTAL
MOVING TO AND FROM BED TO CHAIR: A LOT
SUGGESTED CMS G CODE MODIFIER DAILY ACTIVITY: CL
MOBILITY SCORE: 10
DAILY ACTIVITIY SCORE: 13
DRESSING REGULAR UPPER BODY CLOTHING: A LOT
WALKING IN HOSPITAL ROOM: TOTAL

## 2025-02-25 ASSESSMENT — CHA2DS2 SCORE
DIABETES: YES
AGE 75 OR GREATER: YES
HYPERTENSION: NO
CHF OR LEFT VENTRICULAR DYSFUNCTION: NO
PRIOR STROKE OR TIA OR THROMBOEMBOLISM: NO
AGE 65 TO 74: NO
VASCULAR DISEASE: NO
SEX: FEMALE
CHA2DS2 VASC SCORE: 4

## 2025-02-25 ASSESSMENT — ACTIVITIES OF DAILY LIVING (ADL): TOILETING: INDEPENDENT

## 2025-02-25 ASSESSMENT — GAIT ASSESSMENTS: GAIT LEVEL OF ASSIST: UNABLE TO PARTICIPATE

## 2025-02-25 ASSESSMENT — FIBROSIS 4 INDEX: FIB4 SCORE: 0.78

## 2025-02-25 NOTE — CARE PLAN
The patient is Watcher - Medium risk of patient condition declining or worsening    Shift Goals  Clinical Goals: Monitor labs, Map >65, Improved neuro exams  Patient Goals: Sleep  Family Goals: INNA    Progress made toward(s) clinical / shift goals:      Problem: Knowledge Deficit - Standard  Goal: Patient and family/care givers will demonstrate understanding of plan of care, disease process/condition, diagnostic tests and medications  Outcome: Progressing     Problem: Hemodynamics  Goal: Patient's hemodynamics, fluid balance and neurologic status will be stable or improve  Outcome: Progressing     Problem: Respiratory  Goal: Patient will achieve/maintain optimum respiratory ventilation and gas exchange  Outcome: Progressing     Problem: Neuro Status  Goal: Neuro status will remain stable or improve  Outcome: Progressing     Problem: Bowel Elimination  Goal: Establish and maintain regular bowel function  Outcome: Progressing

## 2025-02-25 NOTE — THERAPY
"Occupational Therapy   Initial Evaluation     Patient Name: Rohini Salinas  Age:  84 y.o., Sex:  female  Medical Record #: [id number]  Today's Date: 2/25/2025     Precautions  Precautions: Fall Risk, Non Weight Bearing Right Upper Extremity  Comments: Hernandez brace R UE    Assessment  Patient is 84 y.o. female admitted from [facility], [place] with DKA, PNA, afib RVR in ED, R breast fungating mass, sepsis, R humerus refracture, angulated; NWB RUE w/ hernandez brace; managed non-op. She has a hx of DM2, known breast mass, colon cancer. Dtr reporting that she has a hx of borderline personality disorder, schizoaffective disorder but it is not in the chart. Per chart \"perineal wounds are suggestive of prolonged periods sitting in her own feces/urine\".    Pt is currently limited by weakness, fatigue, impaired balance, impaired safety awareness, pain, and impaired BUE function, which impacts ability to complete ADLs, ADL txfs, and functional mobility.      Plan    Occupational Therapy Initial Treatment Plan   Treatment Interventions: Self Care / Activities of Daily Living, Adaptive Equipment, Neuro Re-Education / Balance, Therapeutic Exercises, Therapeutic Activity, Family / Caregiver Training  Treatment Frequency: 3 Times per Week  Duration: Until Therapy Goals Met    DC Equipment Recommendations: Unable to determine at this time  Discharge Recommendations: Recommend post-acute placement for additional occupational therapy services prior to discharge home     Subjective    \"I've been having a hard time at home lately.\"     Objective       02/25/25 0906   Prior Living Situation   Prior Services Intermittent Physical Support for ADL Per Family   Housing / Facility 1 Story Apartment / Condo   Bathroom Set up Bathtub / Shower Combination;Grab Bars   Equipment Owned Front-Wheel Walker;Grab Bar(s) In Tub / Shower   Lives with - Patient's Self Care Capacity Alone and Unable to Care For Self   Comments Pt reports her dtr was helping her " "drop off groceries, but that she won't be doing that anymore. Per chart, \"perineal wounds are suggestive of prolonged periods sitting in her own feces/urine\". Pt reports difficulty ambulating lately so she switched to briefs because it is \"easier\".   Prior Level of ADL Function   Self Feeding Independent   Grooming / Hygiene Independent   Bathing Independent   Dressing Independent   Toileting Independent   Comments has been having difficulty   Prior Level of IADL Function   Prior Level Of Mobility Independent With Device in Home   Precautions   Precautions Fall Risk;Non Weight Bearing Right Upper Extremity   Comments Hernandez brace R UE   Vitals   Vitals Comments .5L o2; BP dropped slightly after txf to chair to 88/51, reclined and elevated legs and BP improved to 101/56. VSS otherwise.   Pain 0 - 10 Group   Therapist Pain Assessment Nurse Notified;During Activity  (mod c/o R UE pain)   Cognition    Cognition / Consciousness X   Level of Consciousness Alert   Comments pleasant and cooperative, delayed intermittently throughout. able to follow simple directions. poor insight into situation and deficits.   Active ROM Upper Body   Active ROM Upper Body  X   Dominant Hand Right   Comments RUE w/ hernandez brace but with elbow/wrist/hand free; <1/2 range R elbow, wrist, hand. LUE <3/4 range at shoulder; able to use during grooming tasks.   Strength Upper Body   Upper Body Strength  X   Comments R shoulder NT due to fracture, but distal RUE grossly 3/5   Coordination Upper Body   Coordination X   Comments impaired fine and gross motor coordination R UE   Balance Assessment   Sitting Balance (Static) Fair -   Sitting Balance (Dynamic) Poor +   Standing Balance (Static) Trace +   Standing Balance (Dynamic) Trace   Weight Shift Sitting Poor   Weight Shift Standing Poor   Comments w/ therapist assist   Bed Mobility    Supine to Sit Maximal Assist   Scooting Maximal Assist   ADL Assessment   Grooming Seated;Moderate " Assist  (oral care and wash face L UE w/ set up, increased assistance)   Upper Body Dressing Maximal Assist   Lower Body Dressing Maximal Assist   Toileting Total Assist  (catheter)   Comments encouraged her to use her RUE as able for ADLs such as feeding herself, and gross assist   How much help from another person does the patient currently need...   Putting on and taking off regular lower body clothing? 2   Bathing (including washing, rinsing, and drying)? 2   Toileting, which includes using a toilet, bedpan, or urinal? 2   Putting on and taking off regular upper body clothing? 2   Taking care of personal grooming such as brushing teeth? 2   Eating meals? 3   6 Clicks Daily Activity Score 13   Functional Mobility   Sit to Stand Maximal Assist   Bed, Chair, Wheelchair Transfer Maximal Assist   Transfer Method Stand Pivot   Mobility EOB>stand pivot to recliner   Comments w/ therapist assist   Short Term Goals   Short Term Goal # 1 pt will demo ADL txfs w/ Jonn   Short Term Goal # 2 pt will dress UB w/ Jonn   Short Term Goal # 3 pt will demo toileting w/ Jonn     Patient seen for team evaluation with Physical Therapist for the following reason(s):  Patient required 2 person assistance for safety and to provide effective interventions. Each discipline assisted patient with appropriate and separate goals. Due to the medical complexity, the skill of both practitioners is needed to monitor vitals, patient status, and adjust the intervention to fit the patient's needs and goals.

## 2025-02-25 NOTE — CARE PLAN
"The patient is Unstable - High likelihood or risk of patient condition declining or worsening    Shift Goals  Clinical Goals: Stable hemodynamics with u/o > 30ml/hr and MAP>65; Improving neuro exam; Protect airway  Patient Goals: \"Lie down\"  Family Goals: INNA - no family present    Progress made toward(s) clinical / shift goals:    Problem: Knowledge Deficit - Standard  Goal: Patient and family/care givers will demonstrate understanding of plan of care, disease process/condition, diagnostic tests and medications  Description: Target End Date:  1-3 days or as soon as patient condition allows    Document in Patient Education    1.  Patient and family/caregiver oriented to unit, equipment, visitation policy and means for communicating concern  2.  Complete/review Learning Assessment  3.  Assess knowledge level of disease process/condition, treatment plan, diagnostic tests and medications  4.  Explain disease process/condition, treatment plan, diagnostic tests and medications  Outcome: Progressing     Problem: Hemodynamics  Goal: Patient's hemodynamics, fluid balance and neurologic status will be stable or improve  Description: Target End Date:  Prior to discharge or change in level of care    Document on Assessment and I/O flowsheet templates    1.  Monitor vital signs, pulse oximetry and cardiac monitor per provider order and/or policy  2.  Maintain blood pressure per provider order  3.  Hemodynamic monitoring per provider order  4.  Manage IV fluids and IV infusions  5.  Monitor intake and output  6.  Daily weights per unit policy or provider order  7.  Assess peripheral pulses and capillary refill  8.  Assess color and body temperature  9.  Position patient for maximum circulation/cardiac output  10. Monitor for signs/symptoms of excessive bleeding  11. Assess mental status, restlessness and changes in level of consciousness  12. Monitor temperature and report fever or hypothermia to provider immediately. " Consideration of targeted temperature management.  Outcome: Progressing     Problem: Fluid Volume  Goal: Fluid volume balance will be maintained  Description: Target End Date:  Prior to discharge or change in level of care    Document on I/O flowsheet    1.  Monitor intake and output as ordered  2.  Promote oral intake as appropriate  3.  Report inadequate intake or output to physician  4.  Administer IV therapy as ordered  5.  Weights per provider order  6.  Assess for signs and symptoms of bleeding  7.  Monitor for signs of fluid overload (respiratory changes, edema, weight gain, increased abdominal girth)  8.  Monitor of signs for inadequate fluid volume (poor skin turgor, dry mucous membranes)  9.  Instruct patient on adherence to fluid restrictions  Outcome: Progressing     Problem: Urinary - Renal Perfusion  Goal: Ability to achieve and maintain adequate renal perfusion and functioning will improve  Description: Target End Date:  Prior to discharge or change in level of care    Document on I/O and Assessment flowsheet    1.  Urine output will remain greater than 0.5ml/Kg/HR  2.  Monitor amount and/or characteristics of urine per order/policy. Specific gravity per order/policy  3.  Assess signs and symptoms of renal dysfunction  Outcome: Progressing     Problem: Respiratory  Goal: Patient will achieve/maintain optimum respiratory ventilation and gas exchange  Description: Target End Date:  Prior to discharge or change in level of care    Document on Assessment flowsheet    1.  Assess and monitor rate, rhythm, depth and effort of respiration  2.  Breath sounds assessed qshift and/or as needed  3.  Assess O2 saturation, administer/titrate oxygen as ordered  4.  Position patient for maximum ventilatory efficiency  5.  Turn, cough, and deep breath with splinting to improve effectiveness  6.  Collaborate with RT to administer medication/treatments per order  7.  Encourage use of incentive spirometer and encourage  patient to cough after use and utilize splinting techniques if applicable  8.  Airway suctioning  9.  Monitor sputum production for changes in color, consistency and frequency  10. Perform frequent oral hygiene  11. Alternate physical activity with rest periods  Outcome: Progressing     Problem: Skin Integrity  Goal: Skin integrity is maintained or improved  Description: Target End Date:  Prior to discharge or change in level of care    Document interventions on Skin Risk/Andrew flowsheet groups and corresponding LDA    1.  Assess and monitor skin integrity, appearance and/or temperature  2.  Assess risk factors for impaired skin integrity and/or pressures ulcers  3.  Implement precautions to protect skin integrity in collaboration with interdisciplinary team  4.  Implement pressure ulcer prevention protocol if at risk for skin breakdown  5.  Confirm wound care consult if at risk for skin breakdown  6.  Ensure patient use of pressure relieving devices  (Low air loss bed, waffle overlay, heel protectors, ROHO cushion, etc)  Outcome: Progressing     Problem: Fall Risk  Goal: Patient will remain free from falls  Description: Target End Date:  Prior to discharge or change in level of care    Document interventions on the Eugenia Newman Fall Risk Assessment    1.  Assess for fall risk factors  2.  Implement fall precautions  Outcome: Progressing     Problem: Neuro Status  Goal: Neuro status will remain stable or improve  Description: Target End Date:  Prior to discharge or change in level of care    Document on Neuro assessment in the Assessment flowsheet    1.  Assess and monitor neurologic status per provider order/protocol/unit policy  2.  Assess level of consciousness and orientation  3.  Assess for speech, dysarthria, dysphagia, facial symmetry  4.  Assess visual field, eye movements, gaze preference, pupil reaction and size  5.  Assess muscle strength and motor response in all four extremities  6.  Assess for  "sensation (numbness and tingling)  7.  Assess basic neuro reflexes (cough, gag, corneal)  8.  Identify changes in neuro status and report to provider for testing/treatment orders  Outcome: Progressing     Problem: Knowledge Deficit - Diabetes  Goal: Patient will demonstrate knowledge of insulin injection, symptoms, and treatment of hypoglycemia and diet prior to discharge  Description: Target End Date:  1-3 days or as soon as patient condition allows    1.  Explain disease process, medications and methods used in treating diabetes  2.  Explain signs and symptoms of hyper/hypoglycemia  3.  Educate about lifestyle changes including exercise, diet (carb counting) and sick day management  4.  Educate importance of regular vision appointments, regular foot care and monitoring of skin lesions  5.  Educate patient and family/caregiver on diabetes management and proper use of equipment for testing (syringe, pens and site rotation, glucometer, test strips)  6.  Collaborate with Diabetes Educator  7.  Assess patient and family/caregiver skills with insulin pens and finger sticks. Document level of understanding in patient education  Outcome: Progressing       Patient is not progressing towards the following goals:      Problem: Ineffective Renal Perfusion  Goal: Patient will display appropriate urinary output with specific gravity/laboratory studies near normal, stable weight, vital signs with patient's normal range and absence of edema.  Description: 1.  Accurately record intake and output noting to include \"hidden\" fluids such as IV antibiotic additives, liquid medications, frozen treats, ice chips. Religiously measure gastrointestinal losses and estimate insensible losses (sweating), including  wound drainage, nasogastric outputs, and diarrhea.  2.  Monitor urine specific gravity.  3.  Weigh daily at same time of day, on same scale, with same equipment and clothing.  4.  Assess skin, face, dependent areas for edema.  5.  " Monitor heart rate (HR), BP, and JVD/CVP.  6.  Auscultate lung and heart sounds.  7.  Assess level of consciousness. Investigate changes in mentation, presence of restlessness.  8.  Scatter desired beverages throughout the 24-hour period and give various offering (hot, cold, frozen).  Outcome: Not Progressing

## 2025-02-25 NOTE — PROGRESS NOTES
Critical Care Progress Note    Date of admission  2/23/2025    Chief Complaint  84 y.o. female admitted 2/23/2025 with DKA, AF with RVR and sepsis.    Hospital Course  84 y.o. female with history of diabetes, breast mass, right humerus fracture in 2022 nonoperatively who presented from [place] on 2/23 after she fell at home.  She is found to have atrial fibrillation with RVR as well as DKA.  UA consistent with UTI.    Interval Problem Update  Events in last 24 hours: transferred from [place]  N: A&O, pain in R arm  CV: off phenylephrine. Converted to sinus rhythm.  Off amio.  R: room air  FEN/GI: refusing PO, only asking for H2O.  diarrhea  /Renal: Cr 1.5, downtrending, baseline 0.7  I/O net +4.1L over last 24 hours.   Urine with sediment.  Ibarra in place.  Oliguria.  Heme: hemoglobin 11.5, stable. Platelets 500K.  Heparin infusion for new onset AF  ID: WBC 29K, uptrending.  Empiric CTX + azithro, blood cultures 2/23 pending.  UA with pyuria.  Tm 37.7C  Skin/MSK: R humerus fracture - prelim plan for non-operative management. Good perfusion  Endo: SSI + glargine    Family: daughter Stacy unable to be reached by phone.  She lives home alone.  She ambulates with a walker.  Home medications reconciled       Review of Systems  ROS     Vital Signs for last 24 hours   Pulse:  [] 84  Resp:  [10-46] 25  BP: ()/(46-87) 101/56  SpO2:  [88 %-99 %] 95 %    Hemodynamic parameters for last 24 hours       Respiratory Information for the last 24 hours       Physical Exam   Physical Exam  Vitals and nursing note reviewed.   Constitutional:       General: She is not in acute distress.     Appearance: She is not toxic-appearing.      Comments: Sitting up in a chair, conversing   HENT:      Head: Normocephalic and atraumatic.   Cardiovascular:      Rate and Rhythm: Normal rate and regular rhythm.   Pulmonary:      Effort: No respiratory distress.   Abdominal:      General: There is no distension.      Palpations: Abdomen is  soft.   Musculoskeletal:         General: No swelling.      Comments: RUE deformity now in a brace    Palpable radial pulse   Skin:     General: Skin is warm.   Neurological:      Mental Status: She is alert.      Comments: Awake, conversing, fluent speech  Moves all four extremities including RUE         Medications  Current Facility-Administered Medications   Medication Dose Route Frequency Provider Last Rate Last Admin    magnesium sulfate IVPB premix 2 g  2 g Intravenous Once Flavio Russ M.D. 25 mL/hr at 02/25/25 0919 2 g at 02/25/25 0919    miconazole (Micotin) 2 % cream   Topical BID Flavio Russ M.D.   Given at 02/25/25 0507    cefTRIAXone (Rocephin) syringe 2,000 mg  2,000 mg Intravenous DAILY AT 1800 Melisa Reeder D.O.   2,000 mg at 02/24/25 2051    MD Alert...ICU Electrolyte Replacement per Pharmacy   Other PHARMACY TO DOSE Iván Merritt M.D.        acetaminophen (Tylenol) tablet 650 mg  650 mg Oral Q6HRS PRN Iván Merritt M.D.        senna-docusate (Pericolace Or Senokot S) 8.6-50 MG per tablet 2 Tablet  2 Tablet Oral Q EVENING Iván Merritt M.D.        And    polyethylene glycol/lytes (Miralax) Packet 1 Packet  1 Packet Oral QDAY PRN Iván Merritt M.D.        thiamine (B-1) injection 100 mg  100 mg Intravenous DAILY Iván Merritt M.D.   100 mg at 02/25/25 0506    insulin GLARGINE (Lantus,Semglee) injection  10 Units Subcutaneous Q EVENING Iván Merritt M.D.   10 Units at 02/24/25 1812    And    insulin lispro (HumaLOG,AdmeLOG) subcutaneous injection  0.2 Units/kg/day Subcutaneous TID AC Iván Merritt M.D.   3 Units at 02/24/25 0616    And    insulin lispro (HumaLOG,AdmeLOG) subcutaneous injection  3-14 Units Subcutaneous 4X/DAY ACHS Iván Merritt M.D.   3 Units at 02/24/25 2045    And    dextrose 50% (D50W) injection 25 g  25 g Intravenous Q15 MIN PRN Iván Merritt M.D.        heparin infusion 25,000 units in 500 mL 0.45% NACL  0-30 Units/kg/hr Intravenous Continuous Iván Merritt  M.D. 18.6 mL/hr at 02/25/25 0556 20 Units/kg/hr at 02/25/25 0556    heparin injection 1,900 Units  40 Units/kg Intravenous PRN Iván Merritt M.D.        midodrine (Proamatine) tablet 15 mg  15 mg Oral TID WITH MEALS Iván Merritt M.D.   15 mg at 02/25/25 0812       Fluids    Intake/Output Summary (Last 24 hours) at 2/25/2025 0939  Last data filed at 2/25/2025 0800  Gross per 24 hour   Intake 4765.54 ml   Output 170 ml   Net 4595.54 ml       Laboratory  Recent Labs     02/24/25  1216   ISTATTEMP 36.7 C   ISTATSPEC Venous         Recent Labs     02/23/25 2040 02/24/25 0613 02/24/25  1225 02/25/25  0455   SODIUM 137 138 137 135   POTASSIUM 3.5* 4.0 3.7 3.2*   CHLORIDE 97 103 101 101   CO2 21 25 24 24   BUN 39* 39* 39* 35*   CREATININE 1.90* 1.78* 1.73* 1.50*   MAGNESIUM 2.0 2.0  --  1.7   PHOSPHORUS 2.9 2.0*  --  2.7   CALCIUM 8.7 8.7 8.1* 8.3*     Recent Labs     02/23/25 2040 02/24/25 0613 02/24/25  1225 02/25/25  0455   ALTSGPT 23 22  --  21   ASTSGOT 26 23  --  24   ALKPHOSPHAT 59 71  --  55   TBILIRUBIN 0.2 <0.2  --  <0.2   GLUCOSE 411* 189* 249* 122*     Recent Labs     02/23/25 2040 02/24/25 0613 02/25/25  0455   WBC 22.8* 29.2* 19.4*   ASTSGOT 26 23 24   ALTSGPT 23 22 21   ALKPHOSPHAT 59 71 55   TBILIRUBIN 0.2 <0.2 <0.2     Recent Labs     02/23/25 2040 02/23/25 2233 02/24/25 0613 02/25/25  0455   RBC 4.54  --  4.63 4.04*   HEMOGLOBIN 11.3*  --  11.5* 9.8*   HEMATOCRIT 36.2*  --  36.7* 32.0*   PLATELETCT 416  --  515* 343   PROTHROMBTM 16.0* 15.8*  --   --    APTT  --  24.2*  --   --    INR 1.28* 1.26*  --   --        Assessment/Plan  * Atrial fibrillation with rapid ventricular response (HCC)  Assessment & Plan  Unclear chronicity. TSH normal. Unknown onset - likely associated with sepsis.  Converted to sinus with amiodarone.  Discussed risk/benefit with long term anticoagulation - she wishes to think about it more.  She last fell several months ago, and lives home alone, gets around with a  walker  Therapeutic AC with heparin infusion  Replete K and Mg  DC amiodarone infusion  Echo ordered      HERACLIO (acute kidney injury) ([facility])  Assessment & Plan  Baseline creatinine 0.7.  Up to 1.8.  Likely related to sepsis.  No indications for renal replacement therapy at present.  Renal US with morphologically normal kidneys.  monitor UOP and electrolytes      Sepsis with acute renal failure (HCC)  Assessment & Plan  This is Sepsis Present on admission  SIRS criteria identified on my evaluation include: Tachycardia, with heart rate greater than 90 BPM, Tachypnea, with respirations greater than 20 per minute, and Leukocytosis, with WBC greater than 12,000  Clinical indicators of end organ dysfunction include Lactic Acid greater than 2 and Toxic Metabolic Encephalopathy  Source is likely urinary  Sepsis protocol initiated  Crystalloid Fluid Administration: Fluid resuscitation ordered per standard protocol - 30 mL/kg per current or ideal body weight (plus an additional 1000mL)  IV antibiotics as appropriate for source of sepsis  Reassessment: I have reassessed the patient's hemodynamic status    Blood cultures  Urine culture  Empiric ceftriaxone   Phenylephrine for MAP >65  Trend lactate/end organ perfusion    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  Neurovascularly intact.  Having new pain.  Ortho consult - prelim sling and non-operative    Type 2 diabetes mellitus with ketoacidosis without coma, without long-term current use of insulin ([facility])  Assessment & Plan  A1C 15  Glargine + SSI + mealtime  Replace K, Mg, PO4 per protocol    Renal diet    Mass of right breast  Assessment & Plan  Not acute finding, she has known about this for several years  Biopsy was non-diagnostic  Reasonable to follow up as outpatient as she lives in [place]         VTE:  Heparin  Ulcer: Not Indicated  Lines: Ibarra Catheter  Ongoing indication addressed    L3, transfer  to telemetry.    Discussed with Dr. Espinoza.    Flavio Russ MD, [facility]  Critical Care Medicine  7:22 AM

## 2025-02-25 NOTE — PROGRESS NOTES
MD Block updated regarding patient's continued low urine output. New orders received and implemented.

## 2025-02-25 NOTE — CONSULTS
Hospital Medicine Consultation    Date of Service  2/25/2025    Referring Physician  Flavio Russ M.D.    Consulting Physician  Andre Powell D.O.    Reason for Consultation  Overall medical management    History of Presenting Illness  84 y.o. female who presented 2/23/2025 with DKA, atrial fibrillation with RVR and sepsis.  Patient initially presented to alcohol, transferred here for higher level of care.  Upon arrival, no longer deemed to be in DKA.  Insulin sliding scale was used.  Patient was noted to be in atrial fibrillation with RVR, started on amiodarone and converted to sinus.  Patient was also having septic shock and placed on phenylephrine which has been weaned off.  I did see the patient and discussed the case with the daughter in the morning, at that time patient was lethargic, unable to arouse however I did go back in the afternoon and she was more alert.  I am unsure what her baseline is but I do not think she is quite as clear as her baseline.  I did discuss the case with the intensivist as well as bedside RN and pharmacist.    Review of Systems  Review of Systems   Constitutional:  Positive for malaise/fatigue. Negative for chills and fever.   HENT:  Negative for congestion.    Respiratory:  Negative for cough, sputum production, shortness of breath and stridor.    Cardiovascular:  Negative for chest pain, palpitations and leg swelling.   Gastrointestinal:  Negative for abdominal pain, constipation, diarrhea, nausea and vomiting.   Genitourinary:  Negative for dysuria and urgency.   Musculoskeletal:  Positive for joint pain (right arm). Negative for falls and myalgias.   Skin:         Right breast mass   Neurological:  Negative for dizziness, tingling, loss of consciousness, weakness and headaches.   Psychiatric/Behavioral:  Negative for depression and suicidal ideas.    All other systems reviewed and are negative.      Past Medical History   has a past medical history of Humerus fracture and  Type 2 diabetes mellitus (HCC).    Surgical History   has a past surgical history that includes no pertinent past surgical history.    Family History  family history is not on file.    Social History   reports that she has never smoked. She has never used smokeless tobacco. She reports that she does not drink alcohol and does not use drugs.    Medications  None       Allergies  Not on File    Physical Exam  Pulse:  [] 81  Resp:  [10-34] 18  BP: ()/(46-87) 115/57  SpO2:  [88 %-99 %] 97 %    Physical Exam  Vitals and nursing note reviewed.   Constitutional:       General: She is not in acute distress.     Appearance: She is well-developed. She is ill-appearing. She is not diaphoretic.   HENT:      Head: Normocephalic and atraumatic.      Right Ear: External ear normal.      Left Ear: External ear normal.      Nose: Nose normal. No congestion or rhinorrhea.      Mouth/Throat:      Mouth: Mucous membranes are moist.      Pharynx: No oropharyngeal exudate.   Eyes:      General:         Right eye: No discharge.         Left eye: No discharge.   Neck:      Trachea: No tracheal deviation.   Cardiovascular:      Rate and Rhythm: Normal rate and regular rhythm.      Heart sounds: Murmur heard.      No friction rub. No gallop.   Pulmonary:      Effort: Pulmonary effort is normal. No respiratory distress.      Breath sounds: Normal breath sounds. No stridor. No wheezing or rales.   Abdominal:      General: Bowel sounds are normal. There is no distension.      Palpations: Abdomen is soft.   Musculoskeletal:         General: Tenderness (right arm) present.      Cervical back: Neck supple.      Right lower leg: No edema.      Left lower leg: No edema.   Lymphadenopathy:      Cervical: No cervical adenopathy.   Skin:     General: Skin is warm and dry.      Findings: No erythema or rash.   Neurological:      General: No focal deficit present.      Mental Status: She is alert.   Psychiatric:         Speech: Speech is  delayed.         Behavior: Behavior is slowed.         Cognition and Memory: Cognition is impaired. Memory is impaired.         Fluids  Date 02/25/25 0700 - 02/26/25 0659   Shift 2608-1599 5449-5652 1661-8922 24 Hour Total   INTAKE   I.V. 434.1   434.1   Shift Total 434.1   434.1   OUTPUT   Urine 10   10   Shift Total 10   10   Weight (kg) 50.6 50.6 50.6 50.6       Laboratory  Recent Labs     02/23/25 2040 02/24/25  0613 02/25/25  0455   WBC 22.8* 29.2* 19.4*   RBC 4.54 4.63 4.04*   HEMOGLOBIN 11.3* 11.5* 9.8*   HEMATOCRIT 36.2* 36.7* 32.0*   MCV 79.7* 79.3* 79.2*   MCH 24.9* 24.8* 24.3*   MCHC 31.2* 31.3* 30.6*   RDW 39.7 40.5 40.7   PLATELETCT 416 515* 343   MPV 9.5 9.6 9.5     Recent Labs     02/24/25  0613 02/24/25  1225 02/25/25  0455   SODIUM 138 137 135   POTASSIUM 4.0 3.7 3.2*   CHLORIDE 103 101 101   CO2 25 24 24   GLUCOSE 189* 249* 122*   BUN 39* 39* 35*   CREATININE 1.78* 1.73* 1.50*   CALCIUM 8.7 8.1* 8.3*     Recent Labs     02/23/25 2040 02/23/25  2233   APTT  --  24.2*   INR 1.28* 1.26*                 Imaging  US-RENAL   Final Result      1.  Echogenic debris in the dependent portion of the bladder lumen. The bladder is partially decompressed by an indwelling Ibarra catheter.   2.  Simple appearing upper pole right renal cortical cyst, requiring no further imaging follow-up.   3.  The kidneys are otherwise unremarkable.      EC-ECHOCARDIOGRAM COMPLETE W/O CONT    (Results Pending)       Assessment/Plan  * Atrial fibrillation with rapid ventricular response (HCC)- (present on admission)  Assessment & Plan  No history per daughter at bedside  Converted to sinus on amiodarone  Amiodarone now off, patient remains in sinus  Transition from heparin drip to Eliquis  Patient was unsure about long-term anticoagulation, she is certainly high bleeding risk    Advance care planning- (present on admission)  Assessment & Plan  The first time I saw the patient, she was lethargic and I was unable to arouse her  I  still had a long discussion with the daughter, at that time it was myself, daughter and patient in the room but again the patient was sleeping, daughter was alert and oriented  I did discuss options including full code, DNR/I okay, DNR as well as hospice  Patient has no power of  for medical purposes, daughter did understand the conversation but was unable to make a decision  I did ask the nurse to alert me when the patient was awake  I did go back to see the patient again when she was awake  I again had the same discussion including full code, DNR/I okay and DNR as well as hospice  Patient was very hesitant to make any sort of decision  I had been told previously by the daughter that she did not like to talk about these type of things and has never told them what she would want  I expressed the importance of knowing what she would want in case she worsens especially considering her current state  Patient would just always tell me I understand and then she would say she needed to talk to her daughter  I asked her to talk to her daughter then, I then left to give them some time to discuss  After going back into the room and having further discussion, patient did decide on DNR/I okay  I am unsure if patient fully understands the conversation that we had however her daughter certainly does and her daughter was in agreement with the plan  I also placed a hospice consultation  Patient would benefit from ongoing advanced care planning discussions  Time spent on code discussion was greater than 76 minutes    Microcytic anemia  Assessment & Plan  No sign of gross bleeding  Repeat CBC in the morning    HERACLIO (acute kidney injury) (HCC)- (present on admission)  Assessment & Plan  Due to sepsis and dehydration  Patient is now off IV fluids but she is not drinking adequately  If there is worsening in her kidney function tomorrow, will restart IV fluids  Repeat BMP in the morning  Closely monitor urine output    Sepsis  with acute renal failure (HCC)- (present on admission)  Assessment & Plan  This is Septic shock Present on admission  SIRS criteria identified on my evaluation include: Tachycardia, with heart rate greater than 90 BPM, Tachypnea, with respirations greater than 20 per minute, and Leukocytosis, with WBC greater than 12,000  Clinical indicators of end organ dysfunction include Hypotension with systolic blood pressure less than 90 or MAP less than 65  Indicators of septic shock include: Sepsis present and persistent hypotension despite fluid resuscitation   Sources is: UTI  Sepsis protocol initiated  Crystalloid Fluid Administration: Fluid resuscitation ordered per standard protocol - 30 mL/kg per current or ideal body weight  IV antibiotics as appropriate for source of sepsis  Reassessment: I have reassessed the patient's hemodynamic status  Continue Rocephin  Await culture results  Patient is at significant risk of worsening, does require close monitoring of her hemodynamics    Hypokalemia- (present on admission)  Assessment & Plan  Start oral potassium  Repeat BMP in the morning    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 Evening  She was also started on scheduled short acting insulin 3 units 3 times before meals which will be continued  Continue insulin sliding scale  Continue to adjust as needed    Mass of right breast- (present on admission)  Assessment & Plan  Patient has a large right breast mass  Initially diagnosed in 1/22  At that time, there was a biopsy that showed no malignant cells however due to the appearance of the  mass at that time, surgical consultation was recommended  Patient has not sought additional care  Masses continued to worsen  Judging by the appearance of the mass, likely breast cancer  At this point in time, patient is certainly a poor surgical or treatment candidate  When she improves, we will consider additional imaging with contrast, biopsy or consultation if patient wants to pursue this  During my advance care planning discussion, she did not seem interested however

## 2025-02-25 NOTE — THERAPY
Physical Therapy   Initial Evaluation     Patient Name: Rohini Salinas  Age:  84 y.o., Sex:  female  Medical Record #: [id number]  Today's Date: 2/25/2025     Precautions  Precautions: Fall Risk;Non Weight Bearing Right Upper Extremity  Comments: Payne brace R UE    Assessment  Patient is 84 y.o. female admitted with DKA, PNA, afib with RVR, R breat fungating mass, sepsis, R humerus angulated fracture. R UE fracture being managed non-op in [facility]. PMH includes DM2, known breast mass, colon cancer, and per daughter borderline personality disorder and schizoaffective disorder. Pt was admitted in brief with perineal wounds suggesting prolonged periods of sitting in feces/urine.   Pt was evaluated in ICU setting. She was agreeable to work with therapy. She reports R UE pain and noted recent decline in function prior to admission. Pt required max assist for bed mobility and SPT to recliner. PT will cont while pt is in acute care setting to address deficits.     Plan    Physical Therapy Initial Treatment Plan   Treatment Plan : Bed Mobility, Equipment, Family / Caregiver Training, Gait Training, Neuro Re-Education / Balance, Self Care / Home Evaluation, Therapeutic Activities, Therapeutic Exercise  Treatment Frequency: 3 Times per Week  Duration: Until Therapy Goals Met    DC Equipment Recommendations: Unable to determine at this time  Discharge Recommendations: Recommend post-acute placement for additional physical therapy services prior to discharge home          02/25/25 0901   Vitals   O2 (LPM) 0.5   O2 Delivery Device Silicone Nasal Cannula   Vitals Comments BP dropped slightly after transfer 88/51, recliner with improvement to 101/56   Pain 0 - 10 Group   Therapist Pain Assessment During Activity;Nurse Notified  (R UE pain)   Prior Living Situation   Prior Services Intermittent Physical Support for ADL Per Family   Housing / Facility 1 Story Apartment / Condo   Steps Into Home 0   Steps In Home 0    Bathroom Set up Bathtub / Shower Combination;Grab Bars   Equipment Owned Front-Wheel Walker   Lives with - Patient's Self Care Capacity Alone and Unable to Care For Self   Comments pt reports her daughter was dropping off groceries, but she reports daughter will no longer be assisting her. SHe reports she was having difficutly ambulating recently   Prior Level of Functional Mobility   Bed Mobility Other (Comments)   Comments pt reports progressive decline in function. she has been wearing briefs for ease. she reports she normally ambulates with FWW   History of Falls   History of Falls Yes   Cognition    Cognition / Consciousness X   Level of Consciousness Alert   Comments cooperative, delayed, poor insight   Active ROM Upper Body   Active ROM Upper Body  X   Comments R UE severely limited by injury and in brace hindering shoulder abduction. defer to OT note   Strength Upper Body   Upper Body Strength  X   Comments R UE severely limited by injury, LLE grossly weak   Active ROM Lower Body    Active ROM Lower Body  WDL   Strength Lower Body   Lower Body Strength  X   Gross Strength Generalized Weakness, Equal Bilaterally   Sensation Lower Body   Lower Extremity Sensation   X   Comments chronic LE sensory deficits   Balance Assessment   Sitting Balance (Static) Fair -   Sitting Balance (Dynamic) Poor +   Standing Balance (Static) Trace +   Standing Balance (Dynamic) Trace   Weight Shift Sitting Poor   Weight Shift Standing Poor   Comments with therapist assisting   Bed Mobility    Supine to Sit Maximal Assist   Scooting Maximal Assist   Gait Analysis   Gait Level Of Assist Unable to Participate   Functional Mobility   Sit to Stand Maximal Assist   Bed, Chair, Wheelchair Transfer Maximal Assist   Transfer Method Stand Pivot   Mobility EOB, SPT to recliner   Edema / Skin Assessment   Edema / Skin  Not Assessed   Short Term Goals    Short Term Goal # 1 pt will be able to complete supine<>Sitting from flat bed with min  assist in 6tx in order to progress to prior level   Short Term Goal # 2 pt will be able to complete SPT to and from chair with min assist in 6tx in order to decrease fall risk   Short Term Goal # 3 pt will be able to ambulate 10ft with hemiwalker and min assist in 6tx in order to progress with gait   Education Group   Education Provided Role of Physical Therapist;Weight Bearing Status   Role of Physical Therapist Patient Response Patient;Acceptance;Explanation;Demonstration;Verbal Demonstration;Action Demonstration   Weight Bearing Status Patient Response Patient;Acceptance;Explanation;Demonstration;Verbal Demonstration;Action Demonstration   Anticipated Discharge Equipment and Recommendations   DC Equipment Recommendations Unable to determine at this time   Discharge Recommendations Recommend post-acute placement for additional physical therapy services prior to discharge home

## 2025-02-26 PROBLEM — Z87.81 HISTORY OF FEMUR FRACTURE: Status: ACTIVE | Noted: 2025-02-26

## 2025-02-26 LAB
ALBUMIN SERPL BCP-MCNC: 2 G/DL (ref 3.2–4.9)
ALBUMIN/GLOB SERPL: 0.6 G/DL
ALP SERPL-CCNC: 60 U/L (ref 30–99)
ALT SERPL-CCNC: 19 U/L (ref 2–50)
ANION GAP SERPL CALC-SCNC: 11 MMOL/L (ref 7–16)
AST SERPL-CCNC: 17 U/L (ref 12–45)
BILIRUB SERPL-MCNC: <0.2 MG/DL (ref 0.1–1.5)
BUN SERPL-MCNC: 33 MG/DL (ref 8–22)
CALCIUM ALBUM COR SERPL-MCNC: 9.6 MG/DL (ref 8.5–10.5)
CALCIUM SERPL-MCNC: 8 MG/DL (ref 8.5–10.5)
CHLORIDE SERPL-SCNC: 103 MMOL/L (ref 96–112)
CO2 SERPL-SCNC: 21 MMOL/L (ref 20–33)
CREAT SERPL-MCNC: 1.67 MG/DL (ref 0.5–1.4)
ERYTHROCYTE [DISTWIDTH] IN BLOOD BY AUTOMATED COUNT: 41.5 FL (ref 35.9–50)
GFR SERPLBLD CREATININE-BSD FMLA CKD-EPI: 30 ML/MIN/1.73 M 2
GLOBULIN SER CALC-MCNC: 3.4 G/DL (ref 1.9–3.5)
GLUCOSE BLD STRIP.AUTO-MCNC: 118 MG/DL (ref 65–99)
GLUCOSE BLD STRIP.AUTO-MCNC: 137 MG/DL (ref 65–99)
GLUCOSE BLD STRIP.AUTO-MCNC: 72 MG/DL (ref 65–99)
GLUCOSE BLD STRIP.AUTO-MCNC: 87 MG/DL (ref 65–99)
GLUCOSE SERPL-MCNC: 111 MG/DL (ref 65–99)
HCT VFR BLD AUTO: 30 % (ref 37–47)
HGB BLD-MCNC: 9.4 G/DL (ref 12–16)
MAGNESIUM SERPL-MCNC: 2.2 MG/DL (ref 1.5–2.5)
MCH RBC QN AUTO: 24.9 PG (ref 27–33)
MCHC RBC AUTO-ENTMCNC: 31.3 G/DL (ref 32.2–35.5)
MCV RBC AUTO: 79.4 FL (ref 81.4–97.8)
PHOSPHATE SERPL-MCNC: 3.1 MG/DL (ref 2.5–4.5)
PLATELET # BLD AUTO: 338 K/UL (ref 164–446)
PMV BLD AUTO: 9.8 FL (ref 9–12.9)
POTASSIUM SERPL-SCNC: 4.6 MMOL/L (ref 3.6–5.5)
PROT SERPL-MCNC: 5.4 G/DL (ref 6–8.2)
RBC # BLD AUTO: 3.78 M/UL (ref 4.2–5.4)
SODIUM SERPL-SCNC: 135 MMOL/L (ref 135–145)
WBC # BLD AUTO: 17.6 K/UL (ref 4.8–10.8)

## 2025-02-26 PROCEDURE — 700111 HCHG RX REV CODE 636 W/ 250 OVERRIDE (IP)

## 2025-02-26 PROCEDURE — A9270 NON-COVERED ITEM OR SERVICE: HCPCS | Performed by: STUDENT IN AN ORGANIZED HEALTH CARE EDUCATION/TRAINING PROGRAM

## 2025-02-26 PROCEDURE — 36415 COLL VENOUS BLD VENIPUNCTURE: CPT

## 2025-02-26 PROCEDURE — 700111 HCHG RX REV CODE 636 W/ 250 OVERRIDE (IP): Performed by: INTERNAL MEDICINE

## 2025-02-26 PROCEDURE — 700111 HCHG RX REV CODE 636 W/ 250 OVERRIDE (IP): Performed by: STUDENT IN AN ORGANIZED HEALTH CARE EDUCATION/TRAINING PROGRAM

## 2025-02-26 PROCEDURE — 97602 WOUND(S) CARE NON-SELECTIVE: CPT

## 2025-02-26 PROCEDURE — 85027 COMPLETE CBC AUTOMATED: CPT

## 2025-02-26 PROCEDURE — 700102 HCHG RX REV CODE 250 W/ 637 OVERRIDE(OP): Performed by: STUDENT IN AN ORGANIZED HEALTH CARE EDUCATION/TRAINING PROGRAM

## 2025-02-26 PROCEDURE — 51798 US URINE CAPACITY MEASURE: CPT

## 2025-02-26 PROCEDURE — A9270 NON-COVERED ITEM OR SERVICE: HCPCS | Performed by: INTERNAL MEDICINE

## 2025-02-26 PROCEDURE — 700102 HCHG RX REV CODE 250 W/ 637 OVERRIDE(OP): Performed by: HOSPITALIST

## 2025-02-26 PROCEDURE — 82962 GLUCOSE BLOOD TEST: CPT

## 2025-02-26 PROCEDURE — 83735 ASSAY OF MAGNESIUM: CPT

## 2025-02-26 PROCEDURE — 99233 SBSQ HOSP IP/OBS HIGH 50: CPT | Performed by: HOSPITALIST

## 2025-02-26 PROCEDURE — 700105 HCHG RX REV CODE 258: Performed by: INTERNAL MEDICINE

## 2025-02-26 PROCEDURE — 80053 COMPREHEN METABOLIC PANEL: CPT

## 2025-02-26 PROCEDURE — 86480 TB TEST CELL IMMUN MEASURE: CPT

## 2025-02-26 PROCEDURE — 700102 HCHG RX REV CODE 250 W/ 637 OVERRIDE(OP): Performed by: INTERNAL MEDICINE

## 2025-02-26 PROCEDURE — 770020 HCHG ROOM/CARE - TELE (206)

## 2025-02-26 PROCEDURE — 84100 ASSAY OF PHOSPHORUS: CPT

## 2025-02-26 PROCEDURE — A9270 NON-COVERED ITEM OR SERVICE: HCPCS | Performed by: HOSPITALIST

## 2025-02-26 RX ORDER — OMEPRAZOLE 20 MG/1
20 CAPSULE, DELAYED RELEASE ORAL 2 TIMES DAILY
Status: DISCONTINUED | OUTPATIENT
Start: 2025-02-26 | End: 2025-02-27 | Stop reason: HOSPADM

## 2025-02-26 RX ORDER — GAUZE BANDAGE 2" X 2"
100 BANDAGE TOPICAL DAILY
Status: DISCONTINUED | OUTPATIENT
Start: 2025-02-27 | End: 2025-02-27 | Stop reason: HOSPADM

## 2025-02-26 RX ADMIN — INSULIN LISPRO 3 UNITS: 100 INJECTION, SOLUTION INTRAVENOUS; SUBCUTANEOUS at 14:57

## 2025-02-26 RX ADMIN — MICONAZOLE NITRATE: 2 CREAM TOPICAL at 17:19

## 2025-02-26 RX ADMIN — APIXABAN 2.5 MG: 5 TABLET, FILM COATED ORAL at 04:13

## 2025-02-26 RX ADMIN — MICONAZOLE NITRATE: 2 CREAM TOPICAL at 04:17

## 2025-02-26 RX ADMIN — THIAMINE HYDROCHLORIDE 100 MG: 100 INJECTION, SOLUTION INTRAMUSCULAR; INTRAVENOUS at 04:13

## 2025-02-26 RX ADMIN — POTASSIUM CHLORIDE 40 MEQ: 1500 TABLET, EXTENDED RELEASE ORAL at 04:13

## 2025-02-26 RX ADMIN — APIXABAN 2.5 MG: 5 TABLET, FILM COATED ORAL at 17:18

## 2025-02-26 RX ADMIN — INSULIN GLARGINE-YFGN 10 UNITS: 100 INJECTION, SOLUTION SUBCUTANEOUS at 17:18

## 2025-02-26 RX ADMIN — INSULIN LISPRO 3 UNITS: 100 INJECTION, SOLUTION INTRAVENOUS; SUBCUTANEOUS at 19:40

## 2025-02-26 RX ADMIN — CEFTRIAXONE SODIUM 2000 MG: 10 INJECTION, POWDER, FOR SOLUTION INTRAVENOUS at 17:18

## 2025-02-26 RX ADMIN — ACETAMINOPHEN 650 MG: 325 TABLET ORAL at 15:47

## 2025-02-26 RX ADMIN — MIDODRINE HYDROCHLORIDE 15 MG: 5 TABLET ORAL at 09:16

## 2025-02-26 RX ADMIN — PANTOPRAZOLE SODIUM 40 MG: 40 INJECTION, POWDER, FOR SOLUTION INTRAVENOUS at 04:13

## 2025-02-26 RX ADMIN — MIDODRINE HYDROCHLORIDE 15 MG: 5 TABLET ORAL at 14:57

## 2025-02-26 RX ADMIN — INSULIN LISPRO 3 UNITS: 100 INJECTION, SOLUTION INTRAVENOUS; SUBCUTANEOUS at 09:16

## 2025-02-26 RX ADMIN — ACETAMINOPHEN 650 MG: 325 TABLET ORAL at 23:10

## 2025-02-26 RX ADMIN — OMEPRAZOLE 20 MG: 20 CAPSULE, DELAYED RELEASE ORAL at 17:18

## 2025-02-26 RX ADMIN — MIDODRINE HYDROCHLORIDE 15 MG: 5 TABLET ORAL at 17:18

## 2025-02-26 SDOH — ECONOMIC STABILITY: TRANSPORTATION INSECURITY
IN THE PAST 12 MONTHS, HAS LACK OF RELIABLE TRANSPORTATION KEPT YOU FROM MEDICAL APPOINTMENTS, MEETINGS, WORK OR FROM GETTING THINGS NEEDED FOR DAILY LIVING?: NO

## 2025-02-26 SDOH — ECONOMIC STABILITY: TRANSPORTATION INSECURITY
IN THE PAST 12 MONTHS, HAS THE LACK OF TRANSPORTATION KEPT YOU FROM MEDICAL APPOINTMENTS OR FROM GETTING MEDICATIONS?: NO

## 2025-02-26 ASSESSMENT — SOCIAL DETERMINANTS OF HEALTH (SDOH)
WITHIN THE LAST YEAR, HAVE YOU BEEN HUMILIATED OR EMOTIONALLY ABUSED IN OTHER WAYS BY YOUR PARTNER OR EX-PARTNER?: NO
IN THE PAST 12 MONTHS, HAS THE ELECTRIC, GAS, OIL, OR WATER COMPANY THREATENED TO SHUT OFF SERVICE IN YOUR HOME?: NO
WITHIN THE LAST YEAR, HAVE TO BEEN RAPED OR FORCED TO HAVE ANY KIND OF SEXUAL ACTIVITY BY YOUR PARTNER OR EX-PARTNER?: NO
WITHIN THE LAST YEAR, HAVE YOU BEEN KICKED, HIT, SLAPPED, OR OTHERWISE PHYSICALLY HURT BY YOUR PARTNER OR EX-PARTNER?: NO
WITHIN THE PAST 12 MONTHS, THE FOOD YOU BOUGHT JUST DIDN'T LAST AND YOU DIDN'T HAVE MONEY TO GET MORE: NEVER TRUE
WITHIN THE PAST 12 MONTHS, YOU WORRIED THAT YOUR FOOD WOULD RUN OUT BEFORE YOU GOT THE MONEY TO BUY MORE: NEVER TRUE
WITHIN THE LAST YEAR, HAVE YOU BEEN AFRAID OF YOUR PARTNER OR EX-PARTNER?: NO

## 2025-02-26 ASSESSMENT — COGNITIVE AND FUNCTIONAL STATUS - GENERAL
CLIMB 3 TO 5 STEPS WITH RAILING: TOTAL
TOILETING: A LOT
DAILY ACTIVITIY SCORE: 13
HELP NEEDED FOR BATHING: A LOT
DRESSING REGULAR LOWER BODY CLOTHING: A LOT
PERSONAL GROOMING: A LOT
SUGGESTED CMS G CODE MODIFIER MOBILITY: CL
EATING MEALS: A LITTLE
DRESSING REGULAR UPPER BODY CLOTHING: A LOT
MOVING FROM LYING ON BACK TO SITTING ON SIDE OF FLAT BED: A LOT
STANDING UP FROM CHAIR USING ARMS: A LOT
TURNING FROM BACK TO SIDE WHILE IN FLAT BAD: A LOT
MOVING TO AND FROM BED TO CHAIR: A LOT
MOBILITY SCORE: 10
SUGGESTED CMS G CODE MODIFIER DAILY ACTIVITY: CL
WALKING IN HOSPITAL ROOM: TOTAL

## 2025-02-26 ASSESSMENT — ENCOUNTER SYMPTOMS
BACK PAIN: 0
DOUBLE VISION: 0
PALPITATIONS: 0
BLURRED VISION: 0
BRUISES/BLEEDS EASILY: 0
HEADACHES: 0
DIZZINESS: 0
CHILLS: 0
COUGH: 0
HEARTBURN: 0
VOMITING: 0
PND: 0
MYALGIAS: 0
DEPRESSION: 0
NAUSEA: 0
WHEEZING: 0
CLAUDICATION: 0
HEMOPTYSIS: 0
FEVER: 0

## 2025-02-26 ASSESSMENT — LIFESTYLE VARIABLES
HOW MANY TIMES IN THE PAST YEAR HAVE YOU HAD 5 OR MORE DRINKS IN A DAY: 0
ALCOHOL_USE: NO
EVER HAD A DRINK FIRST THING IN THE MORNING TO STEADY YOUR NERVES TO GET RID OF A HANGOVER: NO
HAVE PEOPLE ANNOYED YOU BY CRITICIZING YOUR DRINKING: NO
EVER FELT BAD OR GUILTY ABOUT YOUR DRINKING: NO
TOTAL SCORE: 0
DOES PATIENT WANT TO STOP DRINKING: NO
ON A TYPICAL DAY WHEN YOU DRINK ALCOHOL HOW MANY DRINKS DO YOU HAVE: 0
TOTAL SCORE: 0
TOTAL SCORE: 0
CONSUMPTION TOTAL: NEGATIVE
HAVE YOU EVER FELT YOU SHOULD CUT DOWN ON YOUR DRINKING: NO
AVERAGE NUMBER OF DAYS PER WEEK YOU HAVE A DRINK CONTAINING ALCOHOL: 0

## 2025-02-26 ASSESSMENT — FIBROSIS 4 INDEX: FIB4 SCORE: 1.25

## 2025-02-26 ASSESSMENT — PATIENT HEALTH QUESTIONNAIRE - PHQ9
SUM OF ALL RESPONSES TO PHQ9 QUESTIONS 1 AND 2: 0
1. LITTLE INTEREST OR PLEASURE IN DOING THINGS: NOT AT ALL
2. FEELING DOWN, DEPRESSED, IRRITABLE, OR HOPELESS: NOT AT ALL

## 2025-02-26 ASSESSMENT — PAIN DESCRIPTION - PAIN TYPE
TYPE: ACUTE PAIN

## 2025-02-26 NOTE — DISCHARGE PLANNING
[id number]  DPA sent referral to [facility] SNF's, per LMSW Felicita.     [id number]  DPA sent referral to [facility], per choice form.

## 2025-02-26 NOTE — DIETARY
"Nutrition Services: Initial Assessment     Day 3 of admit. 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  Pt initially admitted to the ICU, she transferred to the floor yesterday.  Pertinent Medical Hx: Humerus fracture, Type 2 DM  Pertinent Labs: Glucose 111, BUN 33, Creatinine 1.67  Pertinent Meds: Glargine, SSI, Protonix, Thiamine  Skin/Wounds:  full thickness neoplasm to right breast; pt was seen by wound team 2/24.  Food Allergies: None on file  Last BM:  Not observed  02/26/25     Current Diet Order/Intake:   Renal diet; PO <25-50% of meals    Subjective:   Patient reported UBW: 125 lbs  Dietary Recall/Energy Intake: <50% This indicates Insufficient energy intake.     I met with pt and daughter at bedside today. Pt was sitting up in bed, she appeared adequately nourished with age appropriate subcutaneous fat and muscle storage. Pt shared that she has been eating small amounts of food at home. Daughter stated pt receives Meals on Wheels or cooks a Healthy Choice frozen " meal. Pt confirmed that she eats things that are easy to prepare. Discussed oral nutrition supplements with pt. She consumes Premier Protein shakes at home. She is currently receiving Ensure Plus. RD will adjust menu for pt to receive Ensure Max Protein or Nepro shakes. Pt also requested cottage cheese, any flavor of sugar free pudding and no red meat.     Nutrition Focused Physical Exam (NFPE)  Weight Loss: 11% in unknown time frame. Per chart review, pt wt stable since 2022. RD Suspects this change related to debility.  Muscle Mass: Adequately Nourished  Subcutaneous Fat: Adequately Nourished  Fluid Accumulation: none noted  Reduced  Strength: N/A in acute care setting.    Nutrition Diagnosis:      Inadequate Oral Intake related to debility as evidenced by unintended wt loss.      Patient does not meet criteria in congruence with ASPEN/Academy guidelines for malnutrition    Nutrition Interventions:      Consider consistent CHO diet.  Recommend Ensure Max Protein (provides 150 calories, 30 g protein per 11 fl oz) BID.  Patient aware of active plan of care as appropriate.     Nutrition Monitoring and Evaluation:      Monitor nutrition POC, goal for 50% intake from meals and supplements.  Additional fluids per MD/DO  Monitor vital signs pertinent to nutrition.    RD following and will provide updated recommendations as indicated.    Gayle Cintron R.D.                                       ASPEN/AND CRITERIA FOR MALNUTRITION

## 2025-02-26 NOTE — ASSESSMENT & PLAN NOTE
This is Septic shock Present on admission  SIRS criteria identified on my evaluation include: Tachycardia, with heart rate greater than 90 BPM, Tachypnea, with respirations greater than 20 per minute, and Leukocytosis, with WBC greater than 12,000  Clinical indicators of end organ dysfunction include Hypotension with systolic blood pressure less than 90 or MAP less than 65  Indicators of septic shock include: Sepsis present and persistent hypotension despite fluid resuscitation   Sources is: UTI  Sepsis protocol initiated  Crystalloid Fluid Administration: Fluid resuscitation ordered per standard protocol - 30 mL/kg per current or ideal body weight  IV antibiotics as appropriate for source of sepsis  Reassessment: I have reassessed the patient's hemodynamic status  Continue Rocephin  Await culture results  Patient is at significant risk of worsening, does require close monitoring of her hemodynamics    Sepsis resolved continue antibiotics for UTI

## 2025-02-26 NOTE — PROGRESS NOTES
Pt states she feels like she has to void but is unable to. Bladder scan 302. Cooper LITTLEJOHN notified.

## 2025-02-26 NOTE — PROGRESS NOTES
Telemetry Report:      Rhythm:  sr     Heart Rate: 68-72   Ectopy:  pvcs,pacs          GA:  0.19           QRS:       0.08  QT:   0.45        Per Telestrip from Monitor Room

## 2025-02-26 NOTE — ASSESSMENT & PLAN NOTE
Initially, concern for DKA however upon arrival patient was not deemed to be in DKA  She was started on Lantus, continue Lantus at 10 units q. Evening  She was also started on scheduled short acting insulin 3 units 3 times before meals which will be continued  Continue insulin sliding scale  Continue to adjust as needed    Continue monitoring for hypoglycemia and hyperglycemia

## 2025-02-26 NOTE — PROGRESS NOTES
Patient transferred to [facility] via bed, transferred into hospital bed with staff assistance. CURTIS mattress in use with TAPS system prepared to protect patients skin integrity. Patient is alert and oriented x4, requiring 2L of O2 via nasal cannula. Heparin gtt continued, rate verification and 2 RN skin check completed with Kasandra BAEZA. Pt connected to tele monitor, ensured visualization by CMR tech. Patient and daughter updated in regard to plan of care, all questions answered accordingly. Bed is locked and in lowest position, frame alarm on secondary to CURTIS mattress in place, call light within reach.

## 2025-02-26 NOTE — CARE PLAN
The patient is Watcher - Medium risk of patient condition declining or worsening    Shift Goals  Clinical Goals: monitor I/Os, monitor h/h, VSS  Patient Goals: rest  Family Goals: updates    Progress made toward(s) clinical / shift goals:    Problem: Skin Integrity  Goal: Skin integrity is maintained or improved  Description: Target End Date:  Prior to discharge or change in level of care    Document interventions on Skin Risk/Andrew flowsheet groups and corresponding LDA    1.  Assess and monitor skin integrity, appearance and/or temperature  2.  Assess risk factors for impaired skin integrity and/or pressures ulcers  3.  Implement precautions to protect skin integrity in collaboration with interdisciplinary team  4.  Implement pressure ulcer prevention protocol if at risk for skin breakdown  5.  Confirm wound care consult if at risk for skin breakdown  6.  Ensure patient use of pressure relieving devices  (Low air loss bed, waffle overlay, heel protectors, ROHO cushion, etc)  Outcome: Progressing  Note: Q2 hour turns. Wound care onboard and new order placed. CURTIS mattress in place. Pt educated about the importance of frequent repositioning to prevent skin breakdown. Encouraged turning in bed and notifying staff for help. Pt verbalized understanding. Appropriate dressings in place. Extra pillows in place for comfort, between knees, and to float heels. Barrier cream applied as appropriate. Pt cleaned and linens changed frequently as appropriate. Photos uploaded, large bruise on R arm, partially outlined. Wound care qd completed          Patient is not progressing towards the following goals:      Problem: Gastrointestinal Irritability  Goal: Diarrhea will be absent or improved  Description: Target End Date:  Prior to discharge or change in level of care    1.  Assess for abdominal discomfort, pain, cramping, frequency, urgency, loose or liquid stools, and hyperactive bowel sensations.  2.  Evaluate pattern of  defecation  3.  Administer antidiarrheal agents per order  4. Culture stool, per order  5.  Inquire about food intolerances, medications, change in eating pattern and current stressors  6.  Assess for fecal impaction  7.  Assess hydration status  8.  Assess condition of perianal skin  9.  Loss of bowel elimination control can lead of feelings of decreased self esteem.  Examine the emotional impact of illness, hospitalization and/or accidents.  Outcome: Not Progressing  Note: Multiple loose BM overnight, C diff negative, positive occult stool.

## 2025-02-26 NOTE — ASSESSMENT & PLAN NOTE
Due to sepsis and dehydration  Patient is now off IV fluids but she is not drinking adequately  If there is worsening in her kidney function tomorrow, will restart IV fluids  Repeat BMP in the morning  Closely monitor urine output    Cr 1.67, continue monitoring.

## 2025-02-26 NOTE — ASSESSMENT & PLAN NOTE
The first time I saw the patient, she was lethargic and I was unable to arouse her  I still had a long discussion with the daughter, at that time it was myself, daughter and patient in the room but again the patient was sleeping, daughter was alert and oriented  I did discuss options including full code, DNR/I okay, DNR as well as hospice  Patient has no power of  for medical purposes, daughter did understand the conversation but was unable to make a decision  I did ask the nurse to alert me when the patient was awake  I did go back to see the patient again when she was awake  I again had the same discussion including full code, DNR/I okay and DNR as well as hospice  Patient was very hesitant to make any sort of decision  I had been told previously by the daughter that she did not like to talk about these type of things and has never told them what she would want  I expressed the importance of knowing what she would want in case she worsens especially considering her current state  Patient would just always tell me I understand and then she would say she needed to talk to her daughter  I asked her to talk to her daughter then, I then left to give them some time to discuss  After going back into the room and having further discussion, patient did decide on DNR/I okay  I am unsure if patient fully understands the conversation that we had however her daughter certainly does and her daughter was in agreement with the plan  I also placed a hospice consultation  Patient would benefit from ongoing advanced care planning discussions  Time spent on code discussion was greater than 76 minutes      Waiting for hospice eval.

## 2025-02-26 NOTE — HOSPITAL COURSE
84 y.o. female who presented 2/23/2025 with DKA, atrial fibrillation with RVR and sepsis.  Patient initially presented to alcohol, transferred here for higher level of care.  Upon arrival, no longer deemed to be in DKA.  Insulin sliding scale was used.  Patient was noted to be in atrial fibrillation with RVR, started on amiodarone and converted to sinus.  Patient was also having septic shock and placed on phenylephrine which has been weaned off.  I did see the patient and discussed the case with the daughter in the morning, at that time patient was lethargic, unable to arouse however I did go back in the afternoon and she was more alert.  I am unsure what her baseline is but I do not think she is quite as clear as her baseline.  I did discuss the case with the intensivist as well as bedside RN and pharmacist.

## 2025-02-26 NOTE — PROGRESS NOTES
Hospital Medicine Daily Progress Note    Date of Service  2/26/2025    Chief Complaint  Rohini Salinas is a 84 y.o. female admitted 2/23/2025 with sepsis    Hospital Course  84 y.o. female who presented 2/23/2025 with DKA, atrial fibrillation with RVR and sepsis.  Patient initially presented to alcohol, transferred here for higher level of care.  Upon arrival, no longer deemed to be in DKA.  Insulin sliding scale was used.  Patient was noted to be in atrial fibrillation with RVR, started on amiodarone and converted to sinus.  Patient was also having septic shock and placed on phenylephrine which has been weaned off.  I did see the patient and discussed the case with the daughter in the morning, at that time patient was lethargic, unable to arouse however I did go back in the afternoon and she was more alert.  I am unsure what her baseline is but I do not think she is quite as clear as her baseline.  I did discuss the case with the intensivist as well as bedside RN and pharmacist.     Interval Problem Update  2/26 patient is resting in bed, patient is alert oriented follows commands, family is at bedside, discussed with bedside nurse charge nurse  pharmacist, continue IV antibiotics continue monitoring atrial fibrillation continue monitoring for hypo and hyperglycemia, discussed with patient patient's daughter at this time they are considering hospice, hospice evaluation is pending, continue supportive treatment.  All question answered    I have discussed this patient's plan of care and discharge plan at IDT rounds today with Case Management, Nursing, Nursing leadership, and other members of the IDT team.    Consultants/Specialty  Critical care    Code Status  DNAR, I OK    Disposition  The patient is not medically cleared for discharge to home or a post-acute facility.      I have placed the appropriate orders for post-discharge needs.    Review of Systems  Review of Systems   Constitutional:   Negative for chills and fever.   Eyes:  Negative for blurred vision and double vision.   Respiratory:  Negative for cough, hemoptysis and wheezing.    Cardiovascular:  Negative for chest pain, palpitations, claudication, leg swelling and PND.   Gastrointestinal:  Negative for heartburn, nausea and vomiting.   Genitourinary:  Negative for hematuria and urgency.   Musculoskeletal:  Negative for back pain and myalgias.   Skin:  Negative for rash.   Neurological:  Negative for dizziness and headaches.   Endo/Heme/Allergies:  Does not bruise/bleed easily.   Psychiatric/Behavioral:  Negative for depression.         Physical Exam  Temp:  [36.2 °C (97.2 °F)-36.4 °C (97.5 °F)] 36.4 °C (97.5 °F)  Pulse:  [36-77] 77  Resp:  [16-20] 16  BP: (121-140)/(61-77) 121/61  SpO2:  [95 %-98 %] 97 %    Physical Exam    Fluids    Intake/Output Summary (Last 24 hours) at 2/26/2025 1408  Last data filed at 2/26/2025 1025  Gross per 24 hour   Intake 400 ml   Output 0 ml   Net 400 ml        Laboratory  Recent Labs     02/24/25  0613 02/25/25  0455 02/26/25  0523   WBC 29.2* 19.4* 17.6*   RBC 4.63 4.04* 3.78*   HEMOGLOBIN 11.5* 9.8* 9.4*   HEMATOCRIT 36.7* 32.0* 30.0*   MCV 79.3* 79.2* 79.4*   MCH 24.8* 24.3* 24.9*   MCHC 31.3* 30.6* 31.3*   RDW 40.5 40.7 41.5   PLATELETCT 515* 343 338   MPV 9.6 9.5 9.8     Recent Labs     02/24/25  1225 02/25/25  0455 02/26/25  0523   SODIUM 137 135 135   POTASSIUM 3.7 3.2* 4.6   CHLORIDE 101 101 103   CO2 24 24 21   GLUCOSE 249* 122* 111*   BUN 39* 35* 33*   CREATININE 1.73* 1.50* 1.67*   CALCIUM 8.1* 8.3* 8.0*     Recent Labs     02/23/25 2040 02/23/25 2233   APTT  --  24.2*   INR 1.28* 1.26*               Imaging  US-RENAL   Final Result      1.  Echogenic debris in the dependent portion of the bladder lumen. The bladder is partially decompressed by an indwelling Ibarra catheter.   2.  Simple appearing upper pole right renal cortical cyst, requiring no further imaging follow-up.   3.  The kidneys are  otherwise unremarkable.      EC-ECHOCARDIOGRAM COMPLETE W/O CONT    (Results Pending)        Assessment/Plan  * Atrial fibrillation with rapid ventricular response (HCC)- (present on admission)  Assessment & Plan  No history per daughter at bedside  Converted to sinus on amiodarone  Amiodarone now off, patient remains in sinus  Transition from heparin drip to Eliquis  Patient was unsure about long-term anticoagulation, she is certainly high bleeding risk    Continue eliquis for now  Rate controlled    Advance care planning- (present on admission)  Assessment & Plan  The first time I saw the patient, she was lethargic and I was unable to arouse her  I still had a long discussion with the daughter, at that time it was myself, daughter and patient in the room but again the patient was sleeping, daughter was alert and oriented  I did discuss options including full code, DNR/I okay, DNR as well as hospice  Patient has no power of  for medical purposes, daughter did understand the conversation but was unable to make a decision  I did ask the nurse to alert me when the patient was awake  I did go back to see the patient again when she was awake  I again had the same discussion including full code, DNR/I okay and DNR as well as hospice  Patient was very hesitant to make any sort of decision  I had been told previously by the daughter that she did not like to talk about these type of things and has never told them what she would want  I expressed the importance of knowing what she would want in case she worsens especially considering her current state  Patient would just always tell me I understand and then she would say she needed to talk to her daughter  I asked her to talk to her daughter then, I then left to give them some time to discuss  After going back into the room and having further discussion, patient did decide on DNR/I okay  I am unsure if patient fully understands the conversation that we had however  her daughter certainly does and her daughter was in agreement with the plan  I also placed a hospice consultation  Patient would benefit from ongoing advanced care planning discussions  Time spent on code discussion was greater than 76 minutes      Waiting for hospice eval.     Microcytic anemia  Assessment & Plan  No sign of gross bleeding  Repeat CBC in the morning    Continue monitoring    HERACLIO (acute kidney injury) (HCC)- (present on admission)  Assessment & Plan  Due to sepsis and dehydration  Patient is now off IV fluids but she is not drinking adequately  If there is worsening in her kidney function tomorrow, will restart IV fluids  Repeat BMP in the morning  Closely monitor urine output    Cr 1.67, continue monitoring.     Sepsis with acute renal failure (HCC)- (present on admission)  Assessment & Plan  This is Septic shock Present on admission  SIRS criteria identified on my evaluation include: Tachycardia, with heart rate greater than 90 BPM, Tachypnea, with respirations greater than 20 per minute, and Leukocytosis, with WBC greater than 12,000  Clinical indicators of end organ dysfunction include Hypotension with systolic blood pressure less than 90 or MAP less than 65  Indicators of septic shock include: Sepsis present and persistent hypotension despite fluid resuscitation   Sources is: UTI  Sepsis protocol initiated  Crystalloid Fluid Administration: Fluid resuscitation ordered per standard protocol - 30 mL/kg per current or ideal body weight  IV antibiotics as appropriate for source of sepsis  Reassessment: I have reassessed the patient's hemodynamic status  Continue Rocephin  Await culture results  Patient is at significant risk of worsening, does require close monitoring of her hemodynamics    Sepsis resolved continue antibiotics for UTI    Hypokalemia- (present on admission)  Assessment & Plan  Start oral potassium  Repeat BMP in the morning    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 Evening  She was also started on scheduled short acting insulin 3 units 3 times before meals which will be continued  Continue insulin sliding scale  Continue to adjust as needed    Continue monitoring for hypoglycemia and hyperglycemia    Mass of right breast- (present on admission)  Assessment & Plan  Patient has a large right breast mass  Initially diagnosed in 1/22  At that time, there was a biopsy that showed no malignant cells however due to the appearance of the mass at that time, surgical consultation was recommended  Patient has not sought additional care  Masses continued to worsen  Judging by the appearance of the mass, likely breast cancer  At this point in time, patient is certainly a poor surgical or treatment candidate  When she improves, we will consider additional imaging with contrast, biopsy or consultation if patient wants to pursue this  During my advance care planning discussion, she did not seem interested however    Hospice pending         VTE prophylaxis: Eliquis    I have performed a physical exam and reviewed and updated ROS and Plan today (2/26/2025). In review of yesterday's note (2/25/2025), there are no changes except as documented above.      Total time of 51 minutes spent prepping to see patient (e.g. reviewing  tests/imaging results, notes from consultants, bedside nurse, night shift ) obtaining and/or reviewing separately obtained history. Performing a medically appropriate examination and evaluation.  Counseling and educating the patient.  Ordering medications, tests, or procedures.  Referring and  communicating with other health care professionals.  Documenting clinical information in EPIC.  Independently interpreting results and communicating results to patient.  Care coordination.

## 2025-02-26 NOTE — PROGRESS NOTES
4 Eyes Skin Assessment Completed by FELISHA Eckert and FELISHA Slaughter.    Head WDL  Ears Redness and Blanching  Nose WDL  Mouth WDL  Neck WDL- moles  Breast/Chest Redness, known mass to R breast with dressing in place. Skin under mass is red, yellow, black- moles throughout chest  Shoulder Blades WDL  Spine WDL  (R) Arm/Elbow/Hand Redness, Bruising, Swelling, Discoloration, and Edema- R hard sling in place, skin very thin and fragile, edematous. Mepliex throughout entire arm secondary to sling applying pressure.   (L) Arm/Elbow/Hand Redness and Bruising  Abdomen WDL- moles throughout  Groin Redness, Blanching, Excoriation, and Swelling, vagina/labia is very bright red and swollen,  slow to rosie.   Scrotum/Coccyx/Buttocks Redness and Discoloration  (R) Leg WDL  (L) Leg Bruising and redness to hip  (R) Heel/Foot/Toe Redness and Blanching, Right ankle with bruise like discoloration  (L) Heel/Foot/Toe Redness and Blanching          Devices In Places Tele Box, Pulse Ox, and Nasal Cannula hernandez brace      Interventions In Place Gray Ear Foams, TAP System, Pillows, Elbow Mepilex, Q2 Turns, Low Air Loss Mattress, and Heels Loaded W/Pillows  Multiple mepilexes placed throughout arm to help place a barrier between patients skin and brace.   Possible Skin Injury Yes    Pictures Uploaded Into Epic Yes  Wound Consult Placed Yes- wound team already assessed patient, dressing orders for R breast.   RN Wound Prevention Protocol Ordered Yes

## 2025-02-26 NOTE — ASSESSMENT & PLAN NOTE
No history per daughter at bedside  Converted to sinus on amiodarone  Amiodarone now off, patient remains in sinus  Transition from heparin drip to Eliquis  Patient was unsure about long-term anticoagulation, she is certainly high bleeding risk    Continue eliquis for now  Rate controlled

## 2025-02-26 NOTE — CARE PLAN
The patient is Stable - Low risk of patient condition declining or worsening    Shift Goals  Clinical Goals: monitor/control rate/rhythm, monitor labs/VS, antifungal for skin, monitor BG, comfort  Patient Goals: rest, feel better  Family Goals: POC update    Progress made toward(s) clinical / shift goals:    Problem: Knowledge Deficit - Standard  Goal: Patient and family/care givers will demonstrate understanding of plan of care, disease process/condition, diagnostic tests and medications  Description: Target End Date:  1-3 days or as soon as patient condition allows    Document in Patient Education    1.  Patient and family/caregiver oriented to unit, equipment, visitation policy and means for communicating concern  2.  Complete/review Learning Assessment  3.  Assess knowledge level of disease process/condition, treatment plan, diagnostic tests and medications  4.  Explain disease process/condition, treatment plan, diagnostic tests and medications  Outcome: Progressing  Note: Discuss POC with patient  Address questions and concerns, escalate as appropriate  Check for pt understanding of POC       Problem: Hemodynamics  Goal: Patient's hemodynamics, fluid balance and neurologic status will be stable or improve  Description: Target End Date:  Prior to discharge or change in level of care    Document on Assessment and I/O flowsheet templates    1.  Monitor vital signs, pulse oximetry and cardiac monitor per provider order and/or policy  2.  Maintain blood pressure per provider order  3.  Hemodynamic monitoring per provider order  4.  Manage IV fluids and IV infusions  5.  Monitor intake and output  6.  Daily weights per unit policy or provider order  7.  Assess peripheral pulses and capillary refill  8.  Assess color and body temperature  9.  Position patient for maximum circulation/cardiac output  10. Monitor for signs/symptoms of excessive bleeding  11. Assess mental status, restlessness and changes in level of  consciousness  12. Monitor temperature and report fever or hypothermia to provider immediately. Consideration of targeted temperature management.  Outcome: Progressing  Note: Monitor VS, respiratory status, and I&O  Assess circulation in all extremities  Assess LOC  Report abnormal findings to provider       Problem: Fluid Volume  Goal: Fluid volume balance will be maintained  Description: Target End Date:  Prior to discharge or change in level of care    Document on I/O flowsheet    1.  Monitor intake and output as ordered  2.  Promote oral intake as appropriate  3.  Report inadequate intake or output to physician  4.  Administer IV therapy as ordered  5.  Weights per provider order  6.  Assess for signs and symptoms of bleeding  7.  Monitor for signs of fluid overload (respiratory changes, edema, weight gain, increased abdominal girth)  8.  Monitor of signs for inadequate fluid volume (poor skin turgor, dry mucous membranes)  9.  Instruct patient on adherence to fluid restrictions  Outcome: Progressing  Note: I&O monitored and documented per unit standard and orders  Diuretics administered as ordered  Daily weight documented  Lower extremities elevated         Patient is not progressing towards the following goals:

## 2025-02-26 NOTE — ASSESSMENT & PLAN NOTE
Patient has a large right breast mass  Initially diagnosed in 1/22  At that time, there was a biopsy that showed no malignant cells however due to the appearance of the mass at that time, surgical consultation was recommended  Patient has not sought additional care  Masses continued to worsen  Judging by the appearance of the mass, likely breast cancer  At this point in time, patient is certainly a poor surgical or treatment candidate  When she improves, we will consider additional imaging with contrast, biopsy or consultation if patient wants to pursue this  During my advance care planning discussion, she did not seem interested however    Hospice pending

## 2025-02-26 NOTE — DISCHARGE PLANNING
Post Acute Navigator Team    Per chart review, patient has been identified as a candidate for a discharge goals of care discussion.     EOL Index medium-high risk score 16    High LACE+ Readmission score 36   Decreased 6 click mobility score  13   Decreased 6 click ADL score 13   Readmission:  No     Post Acute Navigator will provide education on multiple home-based levels of care depending on patient goals of care, eligibility, prior admissions, and current care plan information.     Voalte sent to Felicita CARPENTER prior to proceeding with discharge goals of care discussion and for care team input. Patient has a hospice order. She does not have support at home in [place], [place]. She will need placement but no payer for SNF and hospice.       1115  Met with Patient Rohini and daughter Stacy. Stacy indicates patient does not have support at home in [place], [place]. Stacy would like Rohini to go any place that she can stay to her end-of-life.   Stacy would like the patient to stay in the [facility] area as Rohini has family here.   Choice was obtained for [facility] with hope of the hospice guest house.     Choice form given to Felicita CARPENTER. Explained situation to Felicita. She will send referral to [facility].   Patient makes $1650.00 between SS and pension. Gave basic qualifications for group home waiver. Stacy can provide the information.         Please reach out to me directly should you have any questions or concerns.

## 2025-02-27 VITALS
WEIGHT: 110.01 LBS | BODY MASS INDEX: 19.49 KG/M2 | DIASTOLIC BLOOD PRESSURE: 76 MMHG | SYSTOLIC BLOOD PRESSURE: 157 MMHG | HEIGHT: 63 IN | TEMPERATURE: 97.5 F | HEART RATE: 79 BPM | OXYGEN SATURATION: 95 % | RESPIRATION RATE: 16 BRPM

## 2025-02-27 PROBLEM — R65.20 SEPSIS WITH ACUTE RENAL FAILURE (HCC): Status: RESOLVED | Noted: 2025-02-23 | Resolved: 2025-02-27

## 2025-02-27 PROBLEM — A41.9 SEPSIS WITH ACUTE RENAL FAILURE (HCC): Status: RESOLVED | Noted: 2025-02-23 | Resolved: 2025-02-27

## 2025-02-27 PROBLEM — N17.9 SEPSIS WITH ACUTE RENAL FAILURE (HCC): Status: RESOLVED | Noted: 2025-02-23 | Resolved: 2025-02-27

## 2025-02-27 PROBLEM — E87.6 HYPOKALEMIA: Status: RESOLVED | Noted: 2025-02-23 | Resolved: 2025-02-27

## 2025-02-27 PROBLEM — I48.91 ATRIAL FIBRILLATION WITH RAPID VENTRICULAR RESPONSE (HCC): Status: RESOLVED | Noted: 2025-02-23 | Resolved: 2025-02-27

## 2025-02-27 LAB
ANION GAP SERPL CALC-SCNC: 11 MMOL/L (ref 7–16)
APPEARANCE UR: ABNORMAL
BACTERIA #/AREA URNS HPF: ABNORMAL /HPF
BACTERIA UR CULT: NORMAL
BILIRUB UR QL STRIP.AUTO: NEGATIVE
BUN SERPL-MCNC: 31 MG/DL (ref 8–22)
CALCIUM SERPL-MCNC: 8.4 MG/DL (ref 8.5–10.5)
CASTS URNS QL MICRO: ABNORMAL /LPF (ref 0–2)
CHLORIDE SERPL-SCNC: 104 MMOL/L (ref 96–112)
CO2 SERPL-SCNC: 20 MMOL/L (ref 20–33)
COLOR UR: ABNORMAL
CREAT SERPL-MCNC: 1.88 MG/DL (ref 0.5–1.4)
EPITHELIAL CELLS 1715: ABNORMAL /HPF (ref 0–5)
ERYTHROCYTE [DISTWIDTH] IN BLOOD BY AUTOMATED COUNT: 41.2 FL (ref 35.9–50)
GAMMA INTERFERON BACKGROUND BLD IA-ACNC: 0.02 IU/ML
GFR SERPLBLD CREATININE-BSD FMLA CKD-EPI: 26 ML/MIN/1.73 M 2
GLUCOSE BLD STRIP.AUTO-MCNC: 86 MG/DL (ref 65–99)
GLUCOSE BLD STRIP.AUTO-MCNC: 92 MG/DL (ref 65–99)
GLUCOSE SERPL-MCNC: 130 MG/DL (ref 65–99)
GLUCOSE UR STRIP.AUTO-MCNC: NEGATIVE MG/DL
HCT VFR BLD AUTO: 31.9 % (ref 37–47)
HGB BLD-MCNC: 10 G/DL (ref 12–16)
KETONES UR STRIP.AUTO-MCNC: NEGATIVE MG/DL
LEUKOCYTE ESTERASE UR QL STRIP.AUTO: ABNORMAL
M TB IFN-G BLD-IMP: ABNORMAL
M TB IFN-G CD4+ BCKGRND COR BLD-ACNC: 0 IU/ML
MAGNESIUM SERPL-MCNC: 2.3 MG/DL (ref 1.5–2.5)
MCH RBC QN AUTO: 24.8 PG (ref 27–33)
MCHC RBC AUTO-ENTMCNC: 31.3 G/DL (ref 32.2–35.5)
MCV RBC AUTO: 79 FL (ref 81.4–97.8)
MICRO URNS: ABNORMAL
MITOGEN IGNF BCKGRD COR BLD-ACNC: 0.04 IU/ML
NITRITE UR QL STRIP.AUTO: POSITIVE
PH UR STRIP.AUTO: 5.5 [PH] (ref 5–8)
PLATELET # BLD AUTO: 371 K/UL (ref 164–446)
PMV BLD AUTO: 9.6 FL (ref 9–12.9)
POTASSIUM SERPL-SCNC: 4.9 MMOL/L (ref 3.6–5.5)
PROT UR QL STRIP: 300 MG/DL
QFT TB2 - NIL TBQ2: 0 IU/ML
RBC # BLD AUTO: 4.04 M/UL (ref 4.2–5.4)
RBC # URNS HPF: ABNORMAL /HPF (ref 0–2)
RBC UR QL AUTO: ABNORMAL
SIGNIFICANT IND 70042: NORMAL
SITE SITE: NORMAL
SODIUM SERPL-SCNC: 135 MMOL/L (ref 135–145)
SOURCE SOURCE: NORMAL
SP GR UR STRIP.AUTO: 1.01
UROBILINOGEN UR STRIP.AUTO-MCNC: 0.2 EU/DL
WBC # BLD AUTO: 14.9 K/UL (ref 4.8–10.8)
WBC #/AREA URNS HPF: >100 /HPF
YEAST BUDDING URNS QL: PRESENT /HPF
YEAST HYPHAE #/AREA URNS HPF: PRESENT /HPF

## 2025-02-27 PROCEDURE — 36415 COLL VENOUS BLD VENIPUNCTURE: CPT

## 2025-02-27 PROCEDURE — A9270 NON-COVERED ITEM OR SERVICE: HCPCS | Performed by: HOSPITALIST

## 2025-02-27 PROCEDURE — 700102 HCHG RX REV CODE 250 W/ 637 OVERRIDE(OP): Performed by: HOSPITALIST

## 2025-02-27 PROCEDURE — 99239 HOSP IP/OBS DSCHRG MGMT >30: CPT | Performed by: HOSPITALIST

## 2025-02-27 PROCEDURE — A9270 NON-COVERED ITEM OR SERVICE: HCPCS | Performed by: INTERNAL MEDICINE

## 2025-02-27 PROCEDURE — 80048 BASIC METABOLIC PNL TOTAL CA: CPT

## 2025-02-27 PROCEDURE — 82962 GLUCOSE BLOOD TEST: CPT

## 2025-02-27 PROCEDURE — 700102 HCHG RX REV CODE 250 W/ 637 OVERRIDE(OP): Performed by: INTERNAL MEDICINE

## 2025-02-27 PROCEDURE — 81001 URINALYSIS AUTO W/SCOPE: CPT

## 2025-02-27 PROCEDURE — 700102 HCHG RX REV CODE 250 W/ 637 OVERRIDE(OP): Performed by: STUDENT IN AN ORGANIZED HEALTH CARE EDUCATION/TRAINING PROGRAM

## 2025-02-27 PROCEDURE — 85027 COMPLETE CBC AUTOMATED: CPT

## 2025-02-27 PROCEDURE — A9270 NON-COVERED ITEM OR SERVICE: HCPCS | Performed by: STUDENT IN AN ORGANIZED HEALTH CARE EDUCATION/TRAINING PROGRAM

## 2025-02-27 PROCEDURE — 87086 URINE CULTURE/COLONY COUNT: CPT

## 2025-02-27 PROCEDURE — 83735 ASSAY OF MAGNESIUM: CPT

## 2025-02-27 RX ORDER — OMEPRAZOLE 20 MG/1
20 CAPSULE, DELAYED RELEASE ORAL 2 TIMES DAILY
Qty: 30 CAPSULE | Refills: 0 | Status: SHIPPED | OUTPATIENT
Start: 2025-02-27 | End: 2025-03-29

## 2025-02-27 RX ORDER — FLUCONAZOLE 200 MG/1
200 TABLET ORAL DAILY
Qty: 7 TABLET | Refills: 0 | Status: ACTIVE | OUTPATIENT
Start: 2025-02-28 | End: 2025-03-07

## 2025-02-27 RX ORDER — FLUCONAZOLE 100 MG/1
200 TABLET ORAL DAILY
Status: DISCONTINUED | OUTPATIENT
Start: 2025-02-27 | End: 2025-02-27 | Stop reason: HOSPADM

## 2025-02-27 RX ORDER — CEFUROXIME AXETIL 250 MG/1
250 TABLET ORAL 2 TIMES DAILY
Qty: 4 TABLET | Refills: 0 | Status: ACTIVE | OUTPATIENT
Start: 2025-02-27 | End: 2025-03-01

## 2025-02-27 RX ADMIN — Medication 100 MG: at 05:17

## 2025-02-27 RX ADMIN — OMEPRAZOLE 20 MG: 20 CAPSULE, DELAYED RELEASE ORAL at 05:17

## 2025-02-27 RX ADMIN — APIXABAN 2.5 MG: 5 TABLET, FILM COATED ORAL at 05:17

## 2025-02-27 RX ADMIN — MICONAZOLE NITRATE: 2 CREAM TOPICAL at 05:17

## 2025-02-27 RX ADMIN — MIDODRINE HYDROCHLORIDE 15 MG: 5 TABLET ORAL at 09:36

## 2025-02-27 RX ADMIN — INSULIN LISPRO 3 UNITS: 100 INJECTION, SOLUTION INTRAVENOUS; SUBCUTANEOUS at 09:34

## 2025-02-27 RX ADMIN — FLUCONAZOLE 200 MG: 100 TABLET ORAL at 09:36

## 2025-02-27 RX ADMIN — MIDODRINE HYDROCHLORIDE 15 MG: 5 TABLET ORAL at 14:06

## 2025-02-27 ASSESSMENT — FIBROSIS 4 INDEX: FIB4 SCORE: 0.95

## 2025-02-27 ASSESSMENT — PAIN DESCRIPTION - PAIN TYPE
TYPE: ACUTE PAIN
TYPE: ACUTE PAIN

## 2025-02-27 NOTE — DISCHARGE PLANNING
Case Management Discharge Planning    Admission Date: 2/23/2025  GMLOS: 4.9  ALOS: 4    6-Clicks ADL Score: 13  6-Clicks Mobility Score: 10  PT and/or OT Eval ordered: Yes  Post-acute Referrals Ordered: Yes  Post-acute Choice Obtained: Yes  Has referral(s) been sent to post-acute provider:  Yes      Anticipated Discharge Dispo: Discharge Disposition: D/T to hospice home (50)  Discharge Address: [address] [place] [address]  Discharge Contact Phone Number: Stacy ()    DME Needed: No    Action(s) Taken: LSW made phone call to [facility] hospice and left message with call back information.    Addendum @5762  LSW received call back from Sana with [facility] who confirmed pt was accepted to the guest house. Sana stated they need to coordinate with pt's daughter to have consents signed and may be able to have pt transferred to the guest house today. Sana will call this LSW back.    Addendum @5531  Confirmed with Sana pt is able to DC to [facility] guest [place] today. Sana asked that transportation be arranged going to [address] [place] [address] at 1430 today. Care team notified and transportation request sent to ride line.    Addendum @3021  Notified COL that transportation is confirmed for 1430.    Escalations Completed: None    Medically Clear: Yes    Next Steps: No CM needs identified at this time    Barriers to Discharge: None    Is the patient up for discharge tomorrow: No-anticipated to DC to [facility] [place] today at 1430

## 2025-02-27 NOTE — DISCHARGE PLANNING
DC Transport Scheduled    Transport Company Scheduled:  [employer]  Spoke with Aminta at [facility] to schedule transport.    Scheduled Date: 2/27/2025  Scheduled Time: 1430    Transport Type: [facility]  Destination:   [facility]   Destination address: [address] [place] [id number]    Notified care team of scheduled transport via Voalte.     If there are any changes needed to the DC transportation scheduled, please contact [facility] at ext. [phone] between the hours of 1800-0110. If outside those hours, contact the ED Case Manager at ext. [phone].

## 2025-02-27 NOTE — CARE PLAN
The patient is Stable - Low risk of patient condition declining or worsening    Shift Goals  Clinical Goals: antifungal medications, skin/wound care, discharge to hospice care  Patient Goals: rest, comfort, discharge  Family Goals: support patient    Progress made toward(s) clinical / shift goals:    Problem: Knowledge Deficit - Standard  Goal: Patient and family/care givers will demonstrate understanding of plan of care, disease process/condition, diagnostic tests and medications  Description: Target End Date:  1-3 days or as soon as patient condition allows    Document in Patient Education    1.  Patient and family/caregiver oriented to unit, equipment, visitation policy and means for communicating concern  2.  Complete/review Learning Assessment  3.  Assess knowledge level of disease process/condition, treatment plan, diagnostic tests and medications  4.  Explain disease process/condition, treatment plan, diagnostic tests and medications  Outcome: Progressing  Note: Discuss POC with patient  Address questions and concerns, escalate as appropriate  Check for pt understanding of POC       Problem: Hemodynamics  Goal: Patient's hemodynamics, fluid balance and neurologic status will be stable or improve  Description: Target End Date:  Prior to discharge or change in level of care    Document on Assessment and I/O flowsheet templates    1.  Monitor vital signs, pulse oximetry and cardiac monitor per provider order and/or policy  2.  Maintain blood pressure per provider order  3.  Hemodynamic monitoring per provider order  4.  Manage IV fluids and IV infusions  5.  Monitor intake and output  6.  Daily weights per unit policy or provider order  7.  Assess peripheral pulses and capillary refill  8.  Assess color and body temperature  9.  Position patient for maximum circulation/cardiac output  10. Monitor for signs/symptoms of excessive bleeding  11. Assess mental status, restlessness and changes in level of  consciousness  12. Monitor temperature and report fever or hypothermia to provider immediately. Consideration of targeted temperature management.  Outcome: Progressing  Note: Monitor VS, respiratory status, and I&O  Assess circulation in all extremities  Assess LOC  Report abnormal findings to provider       Problem: Fluid Volume  Goal: Fluid volume balance will be maintained  Description: Target End Date:  Prior to discharge or change in level of care    Document on I/O flowsheet    1.  Monitor intake and output as ordered  2.  Promote oral intake as appropriate  3.  Report inadequate intake or output to physician  4.  Administer IV therapy as ordered  5.  Weights per provider order  6.  Assess for signs and symptoms of bleeding  7.  Monitor for signs of fluid overload (respiratory changes, edema, weight gain, increased abdominal girth)  8.  Monitor of signs for inadequate fluid volume (poor skin turgor, dry mucous membranes)  9.  Instruct patient on adherence to fluid restrictions  Outcome: Progressing  Note: I&O monitored and documented per unit standard and orders  Lower extremities elevated       Problem: Respiratory  Goal: Patient will achieve/maintain optimum respiratory ventilation and gas exchange  Description: Target End Date:  Prior to discharge or change in level of care    Document on Assessment flowsheet    1.  Assess and monitor rate, rhythm, depth and effort of respiration  2.  Breath sounds assessed qshift and/or as needed  3.  Assess O2 saturation, administer/titrate oxygen as ordered  4.  Position patient for maximum ventilatory efficiency  5.  Turn, cough, and deep breath with splinting to improve effectiveness  6.  Collaborate with RT to administer medication/treatments per order  7.  Encourage use of incentive spirometer and encourage patient to cough after use and utilize splinting techniques if applicable  8.  Airway suctioning  9.  Monitor sputum production for changes in color, consistency  and frequency  10. Perform frequent oral hygiene  11. Alternate physical activity with rest periods  Outcome: Progressing  Note: Assess respiratory status per unit standard and as appropriate  Monitor WOB  Encourage pulmonary hygeine  Administer medications as ordered  Consult with RT as needed       Problem: Skin Integrity  Goal: Skin integrity is maintained or improved  Description: Target End Date:  Prior to discharge or change in level of care    Document interventions on Skin Risk/Andrew flowsheet groups and corresponding LDA    1.  Assess and monitor skin integrity, appearance and/or temperature  2.  Assess risk factors for impaired skin integrity and/or pressures ulcers  3.  Implement precautions to protect skin integrity in collaboration with interdisciplinary team  4.  Implement pressure ulcer prevention protocol if at risk for skin breakdown  5.  Confirm wound care consult if at risk for skin breakdown  6.  Ensure patient use of pressure relieving devices  (Low air loss bed, waffle overlay, heel protectors, ROHO cushion, etc)  Outcome: Progressing  Note: Andrew assessment done  PUP interventions in place per unit standard; see flowsheets       Problem: Fall Risk  Goal: Patient will remain free from falls  Description: Target End Date:  Prior to discharge or change in level of care    Document interventions on the Eugenia Newman Fall Risk Assessment    1.  Assess for fall risk factors  2.  Implement fall precautions  Outcome: Progressing  Note: Fall risk assessment   Fall precautions in place per unit standard and protocol; see flowsheet       Problem: Pain - Standard  Goal: Alleviation of pain or a reduction in pain to the patient’s comfort goal  Description: Target End Date:  Prior to discharge or change in level of care    Document on Vitals flowsheet    1.  Document pain using the appropriate pain scale per order or unit policy  2.  Educate and implement non-pharmacologic comfort measures (i.e.  relaxation, distraction, massage, cold/heat therapy, etc.)  3.  Pain management medications as ordered  4.  Reassess pain after pain med administration per policy  5.  If opiods administered assess patient's response to pain medication is appropriate per POSS sedation scale  6.  Follow pain management plan developed in collaboration with patient and interdisciplinary team (including palliative care or pain specialists if applicable)  Outcome: Progressing  Note: Assess pain per unit standard and as needed  Provide comfort measures as appropriate; see flowsheet  Administer medications as ordered         Patient is not progressing towards the following goals:

## 2025-02-27 NOTE — WOUND TEAM
[facility]  Inpatient Services  Wound and Skin Care Follow-up    Admission Date: 2/23/2025     Last order of IP CONSULT TO WOUND CARE was found on 2/26/2025 from Hospital Encounter on 2/23/2025     HPI, PMH, SH: Reviewed    Past Surgical History:   Procedure Laterality Date    NO PERTINENT PAST SURGICAL HISTORY       Social History     Tobacco Use    Smoking status: Never    Smokeless tobacco: Never   Substance Use Topics    Alcohol use: Never     No chief complaint on file.    Diagnosis: Atrial fibrillation with rapid ventricular response (HCC) [I48.91]    Unit where seen by Wound Team: S151/00     WOUND FOLLOW UP RELATED TO:  Right Breast, Consult for Labia       WOUND TEAM PLAN OF CARE - Frequency of Follow-up:   Nursing to follow dressing orders written for wound care. 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)   Date First Assessed/Time First Assessed: 02/24/25 1434   Primary Wound Type: Neoplasm  Location: Breast  Laterality: Right      Assessments 2/26/2025  4:00 PM   Wound Image     Site Assessment Red;Brown;Black;Drainage   Periwound Assessment Pink   Margins Defined edges   Closure Secondary intention   Drainage Amount Small   Drainage Description Sanguineous   Treatments Cleansed;Nonselective debridement;Site care   Wound Cleansing Normal Saline Irrigation   Periwound Protectant No-sting Skin Prep   Dressing Status Clean;Dry;Intact   Dressing Changed Changed   Dressing Cleansing/Solutions Not Applicable   Dressing Options Absorbent Abdominal Pad;Hypafix Tape;Petrolatum Gauze (yellow)   Dressing  Change/Treatment Frequency Daily, and As Needed   NEXT Dressing Change/Treatment Date 02/27/25   NEXT Weekly Photo (Inpatient Only) 03/05/25   Wound Team Following Weekly   Non-staged Wound Description Full thickness   Shape Irregular   Wound Odor None       Wound 02/26/25 Labia;Vagina Midline Bilateral (Active)   Date First Assessed/Time First Assessed: 02/26/25 0109   Present on Original Admission: Yes  Location: Labia;Vagina  Wound Orientation: Midline  Laterality: Bilateral      Assessments 2/26/2025  4:00 PM   Wound Image     Site Assessment Red;Edema   Periwound Assessment Red;Pink   Closure Open to air   Drainage Amount None   Treatments Cleansed;Nonselective debridement;Site care   Wound Cleansing Foam Cleanser/Washcloth   Periwound Protectant Antifungal Therapy;Skin Moisturizer   Dressing Status Open to Air   Wound Team Following Not following        Vascular:    FORTINO:   No results found.    Lab Values:    Lab Results   Component Value Date/Time    WBC 17.6 (H) 02/26/2025 05:23 AM    RBC 3.78 (L) 02/26/2025 05:23 AM    HEMOGLOBIN 9.4 (L) 02/26/2025 05:23 AM    HEMATOCRIT 30.0 (L) 02/26/2025 05:23 AM    HBA1C 15.0 (H) 02/23/2025 08:40 PM         Culture Results show:  No results found for this or any previous visit (from the past 720 hours).    Pain Level/Medicated:  None, Tolerated without pain medication       INTERVENTIONS BY WOUND TEAM:  Chart and images reviewed. Discussed with bedside RN. All areas of concern (based on picture review, LDA review and discussion with bedside RN) have been thoroughly assessed. Documentation of areas based on significant findings. This RN in to assess patient. Performed standard wound care which includes appropriate positioning, dressing removal and non-selective debridement. Pictures and measurements obtained weekly if/when required.    Wound:  Right Breast  Preparation for Dressing removal: Removed without difficulty  Cleansed/Non-selectively Debrided with:  Normal Saline  and Gauze  Brielle wound: Cleansed with Normal Saline and Gauze, Prepped with No Sting  Primary Dressing:  Xeroform  Secondary (Outer) Dressing: ABD Pad, Hypafix Tape     Wound:  Labia  Cleansed/Non-selectively Debrided with:  No rinse foam soap, Perineal Wipes (Barrier wipes), and Moist warm washcloth    Advanced Wound Care Discharge Planning  Number of Clinicians necessary to complete wound care: 1  Is patient requiring IV pain medications for dressing changes:  No   Length of time for dressing change 15 min. (This does not include chart review, pre-medication time, set up, clean up or time spent charting.)    Interdisciplinary consultation: Patient, Bedside RN, N/A.  Pressure injury and staging reviewed with N/A.    EVALUATION / RATIONALE FOR TREATMENT:     Date:  02/26/25  Wound Status:  Wound improving    Right breast mass is still red and black and with small amount of sanguinous drainage. Per note from Dr. Hendricks, patient wound be a poor surgical candidate at this time. Once patient status improves may consider additional imaging and treatment if patient would like to pursue.  Labia are swollen and red likely due to a combination of incontinence and UTI. Continue with Micotin, using blue top petrolatum ointment, and keeping area clean. Recommend not using purwick to assist with decreasing irritation. No new orders at this time.    Date:  02/24/25  Wound Status:  Initial evaluation    Right breast mass is red and black with a moderate amount of sanguinous drainage. Mass is large and irregular with a fungal appearing component. No odor noted on this assessment. Xeroform (yellow) applied to provide an occlusive dressing that incorporates bacteriostatic protection. Covered with abd pad and hypafix tape. Recommend oncology consult.     Sacrum is red and slow to rosie. Re-applied sacral offloading dressing. Sacrum is a high risk bony prominence due to patient's body habitus. Continue with offloading measures and  Q2T..     BL Heels are intact and blanching. Continue with offloading measures.     Groin and eve-area with fungal and moisture associated skin damage. Anti-fungal therapies initiated and ordered. If no improvement in the coming days, recommend IV fungal therapy.     Patient with skin tears on BL arms left open to air. May apply mepitel one PRN to decrease further skin breakdown. Patient with scattered bruising and abrasions. Offload as needed.            Goals: Steady decrease in wound area and depth weekly.    NURSING PLAN OF CARE ORDERS:  No new orders this visit    NUTRITION RECOMMENDATIONS   Wound Team Recommendations:  N/A     DIET ORDERS (From admission to next 24h)       Start     Ordered    02/26/25 1500  Supplements  BETWEEN MEALS        Question Answer Comment   Which Supplement Ensure    Ensure: Ensure Max (Non Caffeinated) Carton        02/26/25 1446    02/23/25 2216  Diet Order Diet: Renal  ALL MEALS        Question:  Diet:  Answer:  Renal    02/23/25 2215                    PREVENTATIVE INTERVENTIONS:   Q shift Andrew - performed per nursing policy  Q shift pressure point assessments - performed per nursing policy    Surface/Positioning  Low Airloss - Currently in Place  Reposition q 2 hours - Currently in Place  TAPs Turning system - Currently in Place    Offloading/Redistribution  Heel offloading dressing (Silicone dressing) - Currently in Place      Respiratory  Silicone O2 tubing - Currently in Place    Anticipated discharge plans:  TBD        Vac Discharge Needs:  Vac Discharge plan is purely a recommendation from wound team and not a requirement for discharge unless otherwise stated by physician.  Not Applicable Pt not on a wound vac

## 2025-02-27 NOTE — PROGRESS NOTES
Pt off floor with [facility] via [employer]. Pt and daughter aware of discharge plan. Discharge paperwork (x2 copies) given to patient. Facesheet, destination address, and code status order provided to [facility]. PIVs removed.

## 2025-02-27 NOTE — DISCHARGE PLANNING
Case Management Discharge Planning    Admission Date: 2/23/2025  GMLOS: 4.9  ALOS: 3    6-Clicks ADL Score: 13  6-Clicks Mobility Score: 10  PT and/or OT Eval ordered: Yes  Post-acute Referrals Ordered: Yes  Post-acute Choice Obtained: Yes  Has referral(s) been sent to post-acute provider:  Yes      Anticipated Discharge Dispo: Discharge Disposition: D/T to hospice home (50)  Discharge Address: [address] [place] [address]  Discharge Contact Phone Number: Stacy ()    DME Needed: No    Action(s) Taken: ADAMW spoke with Mike with [facility]. Mike confirmed they have accepted pt and will review further whether pt would be a candidate for their guest house. If not a candidate, pt will need  placement. Requested suzette saunders from [facility].    Escalations Completed: None    Medically Clear: Yes    Next Steps: Care coordination will f/u with [facility]    Barriers to Discharge: Pending Placement    Is the patient up for discharge tomorrow: No

## 2025-02-27 NOTE — CARE PLAN
The patient is Watcher - Medium risk of patient condition declining or worsening    Shift Goals  Clinical Goals: monitor I/O, skin integrity, VSS, FSBG  Patient Goals: feel better  Family Goals: joslyn    Progress made toward(s) clinical / shift goals:    Problem: Hemodynamics  Goal: Patient's hemodynamics, fluid balance and neurologic status will be stable or improve  Description: Target End Date:  Prior to discharge or change in level of care    Document on Assessment and I/O flowsheet templates    1.  Monitor vital signs, pulse oximetry and cardiac monitor per provider order and/or policy  2.  Maintain blood pressure per provider order  3.  Hemodynamic monitoring per provider order  4.  Manage IV fluids and IV infusions  5.  Monitor intake and output  6.  Daily weights per unit policy or provider order  7.  Assess peripheral pulses and capillary refill  8.  Assess color and body temperature  9.  Position patient for maximum circulation/cardiac output  10. Monitor for signs/symptoms of excessive bleeding  11. Assess mental status, restlessness and changes in level of consciousness  12. Monitor temperature and report fever or hypothermia to provider immediately. Consideration of targeted temperature management.  Outcome: Progressing     Problem: Respiratory  Goal: Patient will achieve/maintain optimum respiratory ventilation and gas exchange  Description: Target End Date:  Prior to discharge or change in level of care    Document on Assessment flowsheet    1.  Assess and monitor rate, rhythm, depth and effort of respiration  2.  Breath sounds assessed qshift and/or as needed  3.  Assess O2 saturation, administer/titrate oxygen as ordered  4.  Position patient for maximum ventilatory efficiency  5.  Turn, cough, and deep breath with splinting to improve effectiveness  6.  Collaborate with RT to administer medication/treatments per order  7.  Encourage use of incentive spirometer and encourage patient to cough after use  and utilize splinting techniques if applicable  8.  Airway suctioning  9.  Monitor sputum production for changes in color, consistency and frequency  10. Perform frequent oral hygiene  11. Alternate physical activity with rest periods  Outcome: Progressing  Note: Weaned pt to RA. Patient tolerating RA and spo2 mid 90s     Problem: Skin Integrity  Goal: Skin integrity is maintained or improved  Description: Target End Date:  Prior to discharge or change in level of care    Document interventions on Skin Risk/Andrew flowsheet groups and corresponding LDA    1.  Assess and monitor skin integrity, appearance and/or temperature  2.  Assess risk factors for impaired skin integrity and/or pressures ulcers  3.  Implement precautions to protect skin integrity in collaboration with interdisciplinary team  4.  Implement pressure ulcer prevention protocol if at risk for skin breakdown  5.  Confirm wound care consult if at risk for skin breakdown  6.  Ensure patient use of pressure relieving devices  (Low air loss bed, waffle overlay, heel protectors, ROHO cushion, etc)  Outcome: Progressing  Note: Q2 hour turns, Wound care onboard. CURTIS mattress in place. Pt educated about the importance of frequent repositioning to prevent skin breakdown. Encouraged turning in bed and notifying staff for help. Pt verbalized understanding. Appropriate dressings in place. Extra pillows in place for comfort, between knees, and to float heels. Barrier cream applied as appropriate. Pt cleaned and linens changed frequently as appropriate. Applied new mepilex to lower back.         Problem: Bowel Elimination  Goal: Establish and maintain regular bowel function  Description: Target End Date:  Prior to discharge or change in level of care    1.   Note date of last BM  2.   Educate about diet, fluid intake, medication and activity to promote bowel function  3.   Educate signs and symptoms of constipation and interventions to implement  4.   Pharmacologic  bowel management per provider order  5.   Regular toileting schedule  6.   Upright position for toileting  7.   High fiber diet  8.   Encourage hydration  9.   Collaborate with Clinical Dietician  10. Care and maintenance of ostomy if applicable  Outcome: Progressing  Note: Patient continues to have dark and loose stools, however frequency has slowed down compared to prior night

## 2025-02-28 LAB
BACTERIA UR CULT: ABNORMAL
BACTERIA UR CULT: ABNORMAL
SIGNIFICANT IND 70042: ABNORMAL
SITE SITE: ABNORMAL
SOURCE SOURCE: ABNORMAL

## 2025-02-28 NOTE — DISCHARGE SUMMARY
Discharge Summary    CHIEF COMPLAINT ON ADMISSION  No chief complaint on file.      Reason for Admission  Medical (DKA)     Admission Date  2/23/2025    CODE STATUS  DNAR, I OK    HPI & HOSPITAL COURSE    Please see original H&P and consult note for specific information.   84 y.o. female who presented 2/23/2025 with DKA, atrial fibrillation with RVR and sepsis.  Patient initially presented to alcohol, transferred here for higher level of care.  Upon arrival, no longer deemed to be in DKA.  Insulin sliding scale was used.  Patient was noted to be in atrial fibrillation with RVR, started on amiodarone and converted to sinus.  Patient was also having septic shock and placed on phenylephrine which has been weaned off.  I did see the patient and discussed the case with the daughter in the morning, at that time patient was lethargic, unable to arouse however I did go back in the afternoon and she was more alert.  I am unsure what her baseline is but I do not think she is quite as clear as her baseline.  I did discuss the case with the intensivist as well as bedside RN and pharmacist.     2/26 patient is resting in bed, patient is alert oriented follows commands, family is at bedside, discussed with bedside nurse charge nurse  pharmacist, continue IV antibiotics continue monitoring atrial fibrillation continue monitoring for hypo and hyperglycemia, discussed with patient patient's daughter at this time they are considering hospice, hospice evaluation is pending, continue supportive treatment.  All question answered     Patient today is resting comfortable, daughter is at bedside, hospice evaluated patient yesterday and accepted patient to hospice home, patient will be transitioning/discharging to hospice today, discussed with patient's daughter she would like to continue and complete treatment for UTI, will continue with antibiotic and antiyeast medications, patient also since she is transitioning to hospice  will stop her Eliquis and also because she is back to sinus rhythm, patient will be discharging today in stable condition, again because transitioning to hospice will stop insulin, patient at this time has transport arrange has been accepted to hospice house, patient will be discharging today in stable condition, I have discussed with patient's daughter, bedside nurse  on exam and answered.    Therefore, she is discharged in good and stable condition to hospice.    The patient met 2-midnight criteria for an inpatient stay at the time of discharge.    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  No follow-up provider specified.    MEDICATIONS ON DISCHARGE     Medication List        START taking these medications        Instructions   cefUROXime 250 MG Tabs  Commonly known as: Ceftin   Take 1 Tablet by mouth 2 times a day for 2 days.  Dose: 250 mg     fluconazole 200 MG Tabs  Start taking on: February 28, 2025  Commonly known as: Diflucan   Take 1 Tablet by mouth every day for 7 days.  Dose: 200 mg     omeprazole 20 MG delayed-release capsule  Commonly known as: PriLOSEC   Take 1  Capsule by mouth 2 times a day for 30 days.  Dose: 20 mg              Allergies  Not on File    DIET  Orders Placed This Encounter   Procedures    Diet Order Diet: Renal     Standing Status:   Standing     Number of Occurrences:   1     Diet::   Renal [8]    Discontinue Diet Tray     Standing Status:   Standing     Number of Occurrences:   1       ACTIVITY  As tolerated.  Weight bearing as tolerated    CONSULTATIONS  Critical care    PROCEDURES  None    LABORATORY  Lab Results   Component Value Date    SODIUM 135 02/27/2025    POTASSIUM 4.9 02/27/2025    CHLORIDE 104 02/27/2025    CO2 20 02/27/2025    GLUCOSE 130 (H) 02/27/2025    BUN 31 (H) 02/27/2025    CREATININE 1.88 (H) 02/27/2025        Lab Results   Component Value Date    WBC 14.9 (H) 02/27/2025    HEMOGLOBIN 10.0 (L) 02/27/2025    HEMATOCRIT 31.9 (L) 02/27/2025    PLATELETCT 371 02/27/2025        Total time of the discharge process exceeds 31 minutes.

## 2025-03-01 LAB
BACTERIA BLD CULT: NORMAL
BACTERIA BLD CULT: NORMAL
BACTERIA UR CULT: ABNORMAL
BACTERIA UR CULT: ABNORMAL
SIGNIFICANT IND 70042: ABNORMAL
SIGNIFICANT IND 70042: NORMAL
SIGNIFICANT IND 70042: NORMAL
SITE SITE: ABNORMAL
SITE SITE: NORMAL
SITE SITE: NORMAL
SOURCE SOURCE: ABNORMAL
SOURCE SOURCE: NORMAL
SOURCE SOURCE: NORMAL